# Patient Record
Sex: FEMALE | Race: ASIAN | NOT HISPANIC OR LATINO | Employment: UNEMPLOYED | URBAN - METROPOLITAN AREA
[De-identification: names, ages, dates, MRNs, and addresses within clinical notes are randomized per-mention and may not be internally consistent; named-entity substitution may affect disease eponyms.]

---

## 2017-01-09 ENCOUNTER — HISTORICAL (OUTPATIENT)
Dept: CARDIOLOGY | Facility: HOSPITAL | Age: 52
End: 2017-01-09

## 2017-03-14 ENCOUNTER — HISTORICAL (OUTPATIENT)
Dept: RADIOLOGY | Facility: HOSPITAL | Age: 52
End: 2017-03-14

## 2017-03-15 ENCOUNTER — HISTORICAL (OUTPATIENT)
Dept: CARDIOLOGY | Facility: HOSPITAL | Age: 52
End: 2017-03-15

## 2017-03-16 ENCOUNTER — HISTORICAL (OUTPATIENT)
Dept: CARDIOLOGY | Facility: HOSPITAL | Age: 52
End: 2017-03-16

## 2017-03-21 ENCOUNTER — HISTORICAL (OUTPATIENT)
Dept: INTERNAL MEDICINE | Facility: CLINIC | Age: 52
End: 2017-03-21

## 2017-10-27 ENCOUNTER — HISTORICAL (OUTPATIENT)
Dept: RADIOLOGY | Facility: HOSPITAL | Age: 52
End: 2017-10-27

## 2018-10-10 ENCOUNTER — HISTORICAL (OUTPATIENT)
Dept: ADMINISTRATIVE | Facility: HOSPITAL | Age: 53
End: 2018-10-10

## 2018-10-10 LAB
ABS NEUT (OLG): 3.8 X10(3)/MCL (ref 2.1–9.2)
ALBUMIN SERPL-MCNC: 3.9 GM/DL (ref 3.4–5)
ALBUMIN/GLOB SERPL: 1 RATIO (ref 1–2)
ALP SERPL-CCNC: 80 UNIT/L (ref 45–117)
ALT SERPL-CCNC: 22 UNIT/L (ref 12–78)
APPEARANCE, UA: ABNORMAL
AST SERPL-CCNC: 19 UNIT/L (ref 15–37)
BACTERIA #/AREA URNS AUTO: ABNORMAL /[HPF]
BASOPHILS # BLD AUTO: 0.02 X10(3)/MCL
BASOPHILS NFR BLD AUTO: 0 %
BILIRUB SERPL-MCNC: 0.2 MG/DL (ref 0.2–1)
BILIRUB UR QL STRIP: NEGATIVE
BILIRUBIN DIRECT+TOT PNL SERPL-MCNC: <0.1 MG/DL
BILIRUBIN DIRECT+TOT PNL SERPL-MCNC: ABNORMAL MG/DL
BUN SERPL-MCNC: 14 MG/DL (ref 7–18)
CALCIUM SERPL-MCNC: 8.8 MG/DL (ref 8.5–10.1)
CHLORIDE SERPL-SCNC: 109 MMOL/L (ref 98–107)
CO2 SERPL-SCNC: 31 MMOL/L (ref 21–32)
COLOR UR: YELLOW
CREAT SERPL-MCNC: 0.6 MG/DL (ref 0.6–1.3)
EOSINOPHIL # BLD AUTO: 0.22 X10(3)/MCL
EOSINOPHIL NFR BLD AUTO: 3 %
ERYTHROCYTE [DISTWIDTH] IN BLOOD BY AUTOMATED COUNT: 12.7 % (ref 11.5–14.5)
EST. AVERAGE GLUCOSE BLD GHB EST-MCNC: 117 MG/DL
GLOBULIN SER-MCNC: 3.8 GM/ML (ref 2.3–3.5)
GLUCOSE (UA): NORMAL
GLUCOSE SERPL-MCNC: 73 MG/DL (ref 74–106)
HBA1C MFR BLD: 5.7 % (ref 4.2–6.3)
HCT VFR BLD AUTO: 39.3 % (ref 35–46)
HGB BLD-MCNC: 12.5 GM/DL (ref 12–16)
HGB UR QL STRIP: NEGATIVE
HYALINE CASTS #/AREA URNS LPF: ABNORMAL /[LPF]
IMM GRANULOCYTES # BLD AUTO: 0.02 10*3/UL
IMM GRANULOCYTES NFR BLD AUTO: 0 %
KETONES UR QL STRIP: NEGATIVE
LEUKOCYTE ESTERASE UR QL STRIP: 75 LEU/UL
LYMPHOCYTES # BLD AUTO: 2.71 X10(3)/MCL
LYMPHOCYTES NFR BLD AUTO: 37 % (ref 13–40)
MCH RBC QN AUTO: 28.3 PG (ref 26–34)
MCHC RBC AUTO-ENTMCNC: 31.8 GM/DL (ref 31–37)
MCV RBC AUTO: 89.1 FL (ref 80–100)
MONOCYTES # BLD AUTO: 0.53 X10(3)/MCL
MONOCYTES NFR BLD AUTO: 7 % (ref 4–12)
NEUTROPHILS # BLD AUTO: 3.8 X10(3)/MCL
NEUTROPHILS NFR BLD AUTO: 52 X10(3)/MCL
NITRITE UR QL STRIP: NEGATIVE
PH UR STRIP: 7 [PH] (ref 4.5–8)
PLATELET # BLD AUTO: 320 X10(3)/MCL (ref 130–400)
PMV BLD AUTO: 8.5 FL (ref 7.4–10.4)
POTASSIUM SERPL-SCNC: 3.5 MMOL/L (ref 3.5–5.1)
PROT SERPL-MCNC: 7.7 GM/DL (ref 6.4–8.2)
PROT UR QL STRIP: NEGATIVE
RBC # BLD AUTO: 4.41 X10(6)/MCL (ref 4–5.2)
RBC #/AREA URNS AUTO: ABNORMAL /[HPF]
SODIUM SERPL-SCNC: 144 MMOL/L (ref 136–145)
SP GR UR STRIP: 1.02 (ref 1–1.03)
SQUAMOUS #/AREA URNS LPF: ABNORMAL /[LPF]
UROBILINOGEN UR STRIP-ACNC: NORMAL
WBC # SPEC AUTO: 7.3 X10(3)/MCL (ref 4.5–11)
WBC #/AREA URNS AUTO: ABNORMAL /HPF

## 2018-10-12 LAB — FINAL CULTURE: NO GROWTH

## 2018-10-16 ENCOUNTER — HISTORICAL (OUTPATIENT)
Dept: INTERNAL MEDICINE | Facility: CLINIC | Age: 53
End: 2018-10-16

## 2018-11-01 ENCOUNTER — HISTORICAL (OUTPATIENT)
Dept: ADMINISTRATIVE | Facility: HOSPITAL | Age: 53
End: 2018-11-01

## 2018-11-01 LAB
ALBUMIN SERPL-MCNC: 4 GM/DL (ref 3.4–5)
ALBUMIN/GLOB SERPL: 1 RATIO (ref 1–2)
ALP SERPL-CCNC: 81 UNIT/L (ref 45–117)
ALT SERPL-CCNC: 22 UNIT/L (ref 12–78)
APPEARANCE, UA: CLEAR
AST SERPL-CCNC: 21 UNIT/L (ref 15–37)
BACTERIA #/AREA URNS AUTO: ABNORMAL /[HPF]
BILIRUB SERPL-MCNC: 0.4 MG/DL (ref 0.2–1)
BILIRUB UR QL STRIP: NEGATIVE
BILIRUBIN DIRECT+TOT PNL SERPL-MCNC: 0.1 MG/DL
BILIRUBIN DIRECT+TOT PNL SERPL-MCNC: 0.3 MG/DL
BUN SERPL-MCNC: 14 MG/DL (ref 7–18)
CALCIUM SERPL-MCNC: 8.8 MG/DL (ref 8.5–10.1)
CHLORIDE SERPL-SCNC: 105 MMOL/L (ref 98–107)
CHOLEST SERPL-MCNC: 222 MG/DL
CHOLEST/HDLC SERPL: 3 {RATIO} (ref 0–4.4)
CO2 SERPL-SCNC: 29 MMOL/L (ref 21–32)
COLOR UR: NORMAL
CREAT SERPL-MCNC: 0.5 MG/DL (ref 0.6–1.3)
DEPRECATED CALCIDIOL+CALCIFEROL SERPL-MC: 26.48 NG/ML (ref 30–80)
GLOBULIN SER-MCNC: 3.8 GM/ML (ref 2.3–3.5)
GLUCOSE (UA): NORMAL
GLUCOSE SERPL-MCNC: 80 MG/DL (ref 74–106)
HDLC SERPL-MCNC: 75 MG/DL
HGB UR QL STRIP: NEGATIVE
HYALINE CASTS #/AREA URNS LPF: ABNORMAL /[LPF]
KETONES UR QL STRIP: NEGATIVE
LDLC SERPL CALC-MCNC: 117 MG/DL (ref 0–130)
LEUKOCYTE ESTERASE UR QL STRIP: NEGATIVE
NITRITE UR QL STRIP: NEGATIVE
PH UR STRIP: 6.5 [PH] (ref 4.5–8)
POTASSIUM SERPL-SCNC: 4.1 MMOL/L (ref 3.5–5.1)
PROT SERPL-MCNC: 7.8 GM/DL (ref 6.4–8.2)
PROT UR QL STRIP: NEGATIVE
RBC #/AREA URNS AUTO: ABNORMAL /[HPF]
SODIUM SERPL-SCNC: 141 MMOL/L (ref 136–145)
SP GR UR STRIP: 1.02 (ref 1–1.03)
SQUAMOUS #/AREA URNS LPF: ABNORMAL /[LPF]
TRIGL SERPL-MCNC: 152 MG/DL
TSH SERPL-ACNC: 0.94 MIU/L (ref 0.36–3.74)
UROBILINOGEN UR STRIP-ACNC: NORMAL
VLDLC SERPL CALC-MCNC: 30 MG/DL
WBC #/AREA URNS AUTO: ABNORMAL /HPF

## 2018-12-20 ENCOUNTER — HISTORICAL (OUTPATIENT)
Dept: ADMINISTRATIVE | Facility: HOSPITAL | Age: 53
End: 2018-12-20

## 2019-01-31 ENCOUNTER — HISTORICAL (OUTPATIENT)
Dept: INTERNAL MEDICINE | Facility: CLINIC | Age: 54
End: 2019-01-31

## 2019-01-31 LAB
CHOLEST SERPL-MCNC: 278 MG/DL
CHOLEST/HDLC SERPL: 2.7 {RATIO} (ref 0–4.4)
HDLC SERPL-MCNC: 102 MG/DL
LDLC SERPL CALC-MCNC: 156 MG/DL (ref 0–130)
TRIGL SERPL-MCNC: 98 MG/DL
VLDLC SERPL CALC-MCNC: 20 MG/DL

## 2019-03-28 ENCOUNTER — HISTORICAL (OUTPATIENT)
Dept: RADIOLOGY | Facility: HOSPITAL | Age: 54
End: 2019-03-28

## 2019-06-26 ENCOUNTER — HISTORICAL (OUTPATIENT)
Dept: INTERNAL MEDICINE | Facility: CLINIC | Age: 54
End: 2019-06-26

## 2019-06-26 LAB
CHOLEST SERPL-MCNC: 228 MG/DL
CHOLEST/HDLC SERPL: 2.7 {RATIO} (ref 0–4.4)
HDLC SERPL-MCNC: 86 MG/DL
LDLC SERPL CALC-MCNC: 124 MG/DL (ref 0–130)
TRIGL SERPL-MCNC: 88 MG/DL
VLDLC SERPL CALC-MCNC: 18 MG/DL

## 2019-06-27 ENCOUNTER — HISTORICAL (OUTPATIENT)
Dept: RADIOLOGY | Facility: HOSPITAL | Age: 54
End: 2019-06-27

## 2020-01-08 ENCOUNTER — HISTORICAL (OUTPATIENT)
Dept: ADMINISTRATIVE | Facility: HOSPITAL | Age: 55
End: 2020-01-08

## 2020-01-08 LAB
ABS NEUT (OLG): 3.04 X10(3)/MCL (ref 2.1–9.2)
ALBUMIN SERPL-MCNC: 3.8 GM/DL (ref 3.4–5)
ALBUMIN/GLOB SERPL: 1.1 RATIO (ref 1.1–2)
ALP SERPL-CCNC: 62 UNIT/L (ref 45–117)
ALT SERPL-CCNC: 26 UNIT/L (ref 12–78)
APPEARANCE, UA: NORMAL
AST SERPL-CCNC: 23 UNIT/L (ref 15–37)
BACTERIA #/AREA URNS AUTO: ABNORMAL /HPF
BASOPHILS # BLD AUTO: 0 X10(3)/MCL (ref 0–0.2)
BASOPHILS NFR BLD AUTO: 0 %
BILIRUB SERPL-MCNC: 0.5 MG/DL (ref 0.2–1)
BILIRUB UR QL STRIP: NEGATIVE
BILIRUBIN DIRECT+TOT PNL SERPL-MCNC: 0.1 MG/DL (ref 0–0.2)
BILIRUBIN DIRECT+TOT PNL SERPL-MCNC: 0.4 MG/DL
BUN SERPL-MCNC: 10 MG/DL (ref 7–18)
CALCIUM SERPL-MCNC: 8.8 MG/DL (ref 8.5–10.1)
CHLORIDE SERPL-SCNC: 108 MMOL/L (ref 98–107)
CHOLEST SERPL-MCNC: 230 MG/DL
CHOLEST/HDLC SERPL: 2.6 {RATIO} (ref 0–4.4)
CO2 SERPL-SCNC: 29 MMOL/L (ref 21–32)
COLOR UR: YELLOW
CREAT SERPL-MCNC: 0.6 MG/DL (ref 0.6–1.3)
EOSINOPHIL # BLD AUTO: 0.3 X10(3)/MCL (ref 0–0.9)
EOSINOPHIL NFR BLD AUTO: 5 %
ERYTHROCYTE [DISTWIDTH] IN BLOOD BY AUTOMATED COUNT: 12.7 % (ref 11.5–14.5)
EST. AVERAGE GLUCOSE BLD GHB EST-MCNC: 117 MG/DL
GLOBULIN SER-MCNC: 3.4 GM/ML (ref 2.3–3.5)
GLUCOSE (UA): NEGATIVE
GLUCOSE SERPL-MCNC: 85 MG/DL (ref 74–106)
HBA1C MFR BLD: 5.7 % (ref 4.2–6.3)
HCT VFR BLD AUTO: 42.7 % (ref 35–46)
HDLC SERPL-MCNC: 87 MG/DL (ref 40–59)
HGB BLD-MCNC: 13.3 GM/DL (ref 12–16)
HGB UR QL STRIP: NEGATIVE
HYALINE CASTS #/AREA URNS LPF: ABNORMAL /LPF
IMM GRANULOCYTES # BLD AUTO: 0.02 10*3/UL
IMM GRANULOCYTES NFR BLD AUTO: 0 %
KETONES UR QL STRIP: NEGATIVE
LDLC SERPL CALC-MCNC: 111 MG/DL
LEUKOCYTE ESTERASE UR QL STRIP: NEGATIVE
LYMPHOCYTES # BLD AUTO: 2 X10(3)/MCL (ref 0.6–4.6)
LYMPHOCYTES NFR BLD AUTO: 35 %
MCH RBC QN AUTO: 28.3 PG (ref 26–34)
MCHC RBC AUTO-ENTMCNC: 31.1 GM/DL (ref 31–37)
MCV RBC AUTO: 90.9 FL (ref 80–100)
MONOCYTES # BLD AUTO: 0.4 X10(3)/MCL (ref 0.1–1.3)
MONOCYTES NFR BLD AUTO: 6 %
NEUTROPHILS # BLD AUTO: 3.04 X10(3)/MCL (ref 2.1–9.2)
NEUTROPHILS NFR BLD AUTO: 53 %
NITRITE UR QL STRIP: NEGATIVE
PH UR STRIP: 7.5 [PH] (ref 4.5–8)
PLATELET # BLD AUTO: 278 X10(3)/MCL (ref 130–400)
PMV BLD AUTO: 8.3 FL (ref 7.4–10.4)
POTASSIUM SERPL-SCNC: 4.2 MMOL/L (ref 3.5–5.1)
PROT SERPL-MCNC: 7.2 GM/DL (ref 6.4–8.2)
PROT UR QL STRIP: NEGATIVE
RBC # BLD AUTO: 4.7 X10(6)/MCL (ref 4–5.2)
RBC #/AREA URNS AUTO: ABNORMAL /HPF
SODIUM SERPL-SCNC: 142 MMOL/L (ref 136–145)
SP GR UR STRIP: 1.01 (ref 1–1.03)
SQUAMOUS #/AREA URNS LPF: ABNORMAL /LPF
TRIGL SERPL-MCNC: 158 MG/DL
TSH SERPL-ACNC: 0.65 MIU/L (ref 0.36–3.74)
UROBILINOGEN UR STRIP-ACNC: NORMAL
VLDLC SERPL CALC-MCNC: 32 MG/DL
WBC # SPEC AUTO: 5.8 X10(3)/MCL (ref 4.5–11)
WBC #/AREA URNS AUTO: ABNORMAL /HPF

## 2020-01-13 ENCOUNTER — HISTORICAL (OUTPATIENT)
Dept: INTERNAL MEDICINE | Facility: CLINIC | Age: 55
End: 2020-01-13

## 2021-06-16 ENCOUNTER — HISTORICAL (OUTPATIENT)
Dept: RADIOLOGY | Facility: HOSPITAL | Age: 56
End: 2021-06-16

## 2021-10-13 ENCOUNTER — HISTORICAL (OUTPATIENT)
Dept: INTERNAL MEDICINE | Facility: CLINIC | Age: 56
End: 2021-10-13

## 2021-10-13 LAB
ABS NEUT (OLG): 2.68 X10(3)/MCL (ref 2.1–9.2)
ALBUMIN SERPL-MCNC: 3.9 GM/DL (ref 3.5–5)
ALBUMIN/GLOB SERPL: 1.1 RATIO (ref 1.1–2)
ALP SERPL-CCNC: 59 UNIT/L (ref 40–150)
ALT SERPL-CCNC: 13 UNIT/L (ref 0–55)
APPEARANCE, UA: NORMAL
AST SERPL-CCNC: 22 UNIT/L (ref 5–34)
BACTERIA #/AREA URNS AUTO: ABNORMAL /HPF
BASOPHILS # BLD AUTO: 0 X10(3)/MCL (ref 0–0.2)
BASOPHILS NFR BLD AUTO: 0 %
BILIRUB SERPL-MCNC: 0.7 MG/DL
BILIRUB UR QL STRIP: NEGATIVE
BILIRUBIN DIRECT+TOT PNL SERPL-MCNC: 0.2 MG/DL (ref 0–0.5)
BILIRUBIN DIRECT+TOT PNL SERPL-MCNC: 0.5 MG/DL (ref 0–0.8)
BUN SERPL-MCNC: 10.6 MG/DL (ref 9.8–20.1)
CALCIUM SERPL-MCNC: 9.4 MG/DL (ref 8.4–10.2)
CHLORIDE SERPL-SCNC: 108 MMOL/L (ref 98–107)
CHOLEST SERPL-MCNC: 226 MG/DL
CHOLEST/HDLC SERPL: 3 {RATIO} (ref 0–5)
CO2 SERPL-SCNC: 25 MMOL/L (ref 22–29)
COLOR UR: NORMAL
CREAT SERPL-MCNC: 0.67 MG/DL (ref 0.55–1.02)
DEPRECATED CALCIDIOL+CALCIFEROL SERPL-MC: 56 NG/ML (ref 30–80)
EOSINOPHIL # BLD AUTO: 0.3 X10(3)/MCL (ref 0–0.9)
EOSINOPHIL NFR BLD AUTO: 5 %
ERYTHROCYTE [DISTWIDTH] IN BLOOD BY AUTOMATED COUNT: 12.7 % (ref 11.5–14.5)
EST. AVERAGE GLUCOSE BLD GHB EST-MCNC: 111.2 MG/DL
GLOBULIN SER-MCNC: 3.4 GM/DL (ref 2.4–3.5)
GLUCOSE (UA): NEGATIVE
GLUCOSE SERPL-MCNC: 87 MG/DL (ref 74–100)
HBA1C MFR BLD: 5.5 %
HCT VFR BLD AUTO: 41.9 % (ref 35–46)
HDLC SERPL-MCNC: 70 MG/DL (ref 35–60)
HGB BLD-MCNC: 13.4 GM/DL (ref 12–16)
HGB UR QL STRIP: NEGATIVE
HYALINE CASTS #/AREA URNS LPF: ABNORMAL /LPF
IMM GRANULOCYTES # BLD AUTO: 0.01 10*3/UL
IMM GRANULOCYTES NFR BLD AUTO: 0 %
KETONES UR QL STRIP: NEGATIVE
LDLC SERPL CALC-MCNC: 124 MG/DL (ref 50–140)
LEUKOCYTE ESTERASE UR QL STRIP: NEGATIVE
LYMPHOCYTES # BLD AUTO: 2.2 X10(3)/MCL (ref 0.6–4.6)
LYMPHOCYTES NFR BLD AUTO: 39 %
MCH RBC QN AUTO: 28.5 PG (ref 26–34)
MCHC RBC AUTO-ENTMCNC: 32 GM/DL (ref 31–37)
MCV RBC AUTO: 89 FL (ref 80–100)
MONOCYTES # BLD AUTO: 0.4 X10(3)/MCL (ref 0.1–1.3)
MONOCYTES NFR BLD AUTO: 6 %
NEUTROPHILS # BLD AUTO: 2.68 X10(3)/MCL (ref 2.1–9.2)
NEUTROPHILS NFR BLD AUTO: 48 %
NITRITE UR QL STRIP: NEGATIVE
NRBC BLD AUTO-RTO: 0 % (ref 0–0.2)
PH UR STRIP: 7.5 [PH] (ref 4.5–8)
PLATELET # BLD AUTO: 264 X10(3)/MCL (ref 130–400)
PMV BLD AUTO: 8.4 FL (ref 7.4–10.4)
POTASSIUM SERPL-SCNC: 4.3 MMOL/L (ref 3.5–5.1)
PROT SERPL-MCNC: 7.3 GM/DL (ref 6.4–8.3)
PROT UR QL STRIP: NEGATIVE
RBC # BLD AUTO: 4.71 X10(6)/MCL (ref 4–5.2)
RBC #/AREA URNS AUTO: ABNORMAL /HPF
SODIUM SERPL-SCNC: 141 MMOL/L (ref 136–145)
SP GR UR STRIP: 1.01 (ref 1–1.03)
SQUAMOUS #/AREA URNS LPF: ABNORMAL /LPF
TRIGL SERPL-MCNC: 160 MG/DL (ref 37–140)
TSH SERPL-ACNC: 0.96 UIU/ML (ref 0.35–4.94)
UROBILINOGEN UR STRIP-ACNC: NORMAL
VLDLC SERPL CALC-MCNC: 32 MG/DL
WBC # SPEC AUTO: 5.6 X10(3)/MCL (ref 4.5–11)
WBC #/AREA URNS AUTO: ABNORMAL /HPF

## 2022-04-10 ENCOUNTER — HISTORICAL (OUTPATIENT)
Dept: ADMINISTRATIVE | Facility: HOSPITAL | Age: 57
End: 2022-04-10
Payer: MEDICAID

## 2022-04-28 VITALS
BODY MASS INDEX: 19.1 KG/M2 | DIASTOLIC BLOOD PRESSURE: 70 MMHG | BODY MASS INDEX: 18.38 KG/M2 | WEIGHT: 99.88 LBS | HEIGHT: 62 IN | OXYGEN SATURATION: 99 % | WEIGHT: 103.81 LBS | SYSTOLIC BLOOD PRESSURE: 123 MMHG | SYSTOLIC BLOOD PRESSURE: 106 MMHG | DIASTOLIC BLOOD PRESSURE: 80 MMHG | HEIGHT: 62 IN

## 2022-05-03 NOTE — HISTORICAL [facility] CERNER
This is a historical note converted from [employer]. Formatting and pictures may have been removed.  Please reference Jaylene for original formatting and attached multimedia. Chief Complaint  Patient c/o dizziness and weekness x5 months  History of Present Illness  52 yo female with complaints of dizziness and weakness x 5 months. Dizziness with movement of head back and forth while in bed. Denies sinus congestion or pressure; no ear pain. ?No numbness, tingling, or weakness of extremities. ?No facial weakness?or difficulty speaking. ?Reports recent polydipsia recently when she feels week; no polyuria. No recent weight changes. ?Denies nausea or vomiting; no urinary complaints. No chest pain. She missed last appointment with [facility] in May for routine followup. ?Patient had a cardiology?workup last year for palpitations which was normal.  Review of Systems  GENERAL: Negative except as stated?in HPI  CV: Negative except as stated in HPI  RESP: Negative except as stated?in HPI  GI: Negative?except as stated in?HPI  : Negative?except as stated in?HPI  SKIN: Negative?except as stated in?HPI  Neuro: Negative?except as stated in?HPI  MS: Negative?except as stated in?HPI  Psych: Negative?except as stated in?HPI  Physical Exam  Vitals & Measurements  T:?36.6? ?C (Oral)? HR:?80(Peripheral)? BP:?123/80? BP:?112/71(Sitting)? BP:?120/82(Standing)? BP:?105/67(Supine)?  HT:?157?cm? HT:?157?cm? WT:?47.1?kg? WT:?47.1?kg? BMI:?19.11?  Vital Signs reviewed  GENERAL: Awake and alert; no acute distress.  EYES: Pupils round,?equal and?reactive to light. Extraocular movements intact. No exophthalmos.  HENT: Normocephalic. Normal appearing oral cavity and posterior pharynx. Tympanic membranes normal; no erythema, effusion or perforation.  CV: Normal rate and rhythm. No murmur or JVD. No edema. Normal peripheral pulses and perfusion.  RESP: Respirations even and nonlabored. BBS clear to auscultation.  MUSCULOSKELETAL: Full passive and active ROM  of all joints. No bony deformities,?joint tenderness or swelling.?Normal gait.??Symmetrical?+2 strength to all extremities.  NEURO: Awake, alert and oriented to person, place, time and situation. Normal speech pattern. ?No focal or sensory deficits noted.  INTEGUMENTARY: Skin warm, dry, and intact. No rashes or?unusual bruising.  PSYCH: Appropriate mood and affect; cooperative.  Assessment/Plan  1.?Vertigo,?Dizziness  Neuro exam normal. CBC unremarkable, urine with + leuk estrace; urine culture and sensitivity pending. Orthostatic vital signs normal. Rx for meclizine. TO ER for new developing neurological symptoms or worsening in condition. PCP appointment with C at discharge.  Ordered:  After Hrs Visit [id number] PC, Dizziness  Vertigo, 10/10/18 18:58:00 CDT  Comprehensive Metabolic Panel, Stat collect, 10/10/18 18:15:00 CDT, Blood, Collected, Stop date 10/10/18 18:15:00 CDT, Nurse collect, Dizziness, 10/10/18 18:15:00 CDT  Office/Outpatient Visit Level 3 Established [id number] PC, Dizziness  Vertigo, 10/10/18 18:58:00 CDT  Orthostatic Vital Signs, 10/10/18 18:14:00 CDT, Stop date 10/10/18 18:14:00 CDT  ?  Polydipsia  CMP with glucose of 73 mg/dl. A1c pending. Small frequent meals. Follow up with PCP.  Ordered:  Hemoglobin A1C [facility], Stat collect, 10/10/18 18:15:00 CDT, Blood, Stop date 10/10/18 18:16:00 CDT, Nurse collect, Polydipsia, 10/10/18 18:15:00 CDT  ?  Orders:  meclizine, 12.5 mg = 1 tab(s), Oral, TID, PRN PRN as needed for dizziness, # 15 tab(s), 0 Refill(s), Pharmacy: [facility] [id number]   Problem List/Past Medical History  Ongoing  Elevated lipids  Visit for screening mammogram  Well adult exam  Historical  No qualifying data  Procedure/Surgical History  Abdominal hysterectomy   Medications  ACETAMINOPHEN/COD #3 (300/30MG) TAB, 1 tab(s), Oral, q4-6hr  AMOXICILLIN 500MG CAPSULES  aspirin 81 mg oral tablet, 81 mg= 1 tab(s), Oral, Daily,? ?Not taking  calcium (as carbonate)-vitamin D 600 mg-400 intl units  oral tablet, 1 tab(s), Oral, BID,? ?Not taking  CHLORHEXIDINE 0.12% ORAL RINSE, Oral,? ?Not taking  meclizine 12.5 mg oral tablet, 12.5 mg= 1 tab(s), Oral, TID, PRN  One-A-Day 50+, 1 tab(s), Oral, Daily  Allergies  No Known Allergies  Social History  Alcohol  Never, 10/08/2015  Employment/School  Unemployed, 01/25/2017  Unemployed, Work/School description: for about 5 months., 12/21/2016  Employed, 10/08/2015  Home/Environment  Lives with Spouse. Living situation: Home/Independent. Alcohol abuse in household: No. Substance abuse in household: No. Smoker in household: No. Feels unsafe at home: No. Safe place to go: Yes. Family/Friends available for support: Yes., 01/25/2017  Lives with Alone. Living situation: Home/Independent. Alcohol abuse in household: No. Substance abuse in household: No. Smoker in household: No. Injuries/Abuse/Neglect in household: No. Feels unsafe at home: Yes. Safe place to go: Yes. Agency(s)/Others notified: No. Family/Friends available for support: Yes. Concern for family members at home: No. Major illness in household: No. Financial concerns: No. TV/Computer concerns: No., 11/21/2016  Lives with Alone., 10/08/2015  Nutrition/Health  Vegetarian, 01/25/2017  Other  Sexual  Substance Abuse  Never, 10/08/2015  Tobacco  Never smoker, 10/08/2015  Family History  Hypertension.: Mother.  Immunizations  Vaccine Date Status   tetanus/diphtheria/pertussis, acel(Tdap) 11/21/2016 Given   influenza virus vaccine, inactivated 11/21/2016 Given   Health Maintenance  Health Maintenance  ???Pending?(in the next year)  ??? ??OverDue  ??? ? ? ?Diabetes Screening due??and every?  ??? ? ? ?Colorectal Screening due??12/14/17??and every 1??year(s)  ??? ? ? ?Alcohol Misuse Screening due??07/25/18??and every 1??year(s)  ??? ??Due?  ??? ? ? ?ADL Screening due??10/10/18??and every 1??year(s)  ??? ? ? ?Influenza Vaccine due??10/10/18??and every?  ???Satisfied?(in the past 1 year)  ??? ??Satisfied?  ??? ? ? ?Blood  Pressure Screening on??10/10/18.??Satisfied by Lisa Conway LPN  ??? ? ? ?Body Mass Index Check on??10/10/18.??Satisfied by Becky Moody MA  ??? ? ? ?Breast Cancer Screening on??10/27/17.??Satisfied by Odette Lund  ??? ? ? ?Depression Screening on??10/10/18.??Satisfied by Becky Moody MA  ??? ? ? ?Diabetes Screening on??10/10/18.??Satisfied by Kelley Chang  ??? ? ? ?Influenza Vaccine on??10/10/18.??Satisfied by Becky Moody MA  ??? ? ? ?Lipid Screening on??04/25/18.??Satisfied by Madalyn Acevedo NP  ??? ? ? ?Obesity Screening on??10/10/18.??Satisfied by Becky Moody MA  ?  ?  Lab Results  Test Name Test Result Date/Time   Sodium Lvl 144 mmol/L 10/10/2018 18:15 CDT   Potassium Lvl 3.5 mmol/L 10/10/2018 18:15 CDT   Glucose Lvl 73 mg/dL (Low) 10/10/2018 18:15 CDT    mg/dL (High) 10/10/2018 18:15 CDT   BUN 14 mg/dL 10/10/2018 18:15 CDT   Creatinine 0.60 mg/dL 10/10/2018 18:15 CDT   Hgb A1c 5.7 % 10/10/2018 18:15 CDT   WBC 7.3 x10(3)/mcL 10/10/2018 18:15 CDT   Hgb 12.5 gm/dL 10/10/2018 18:15 CDT   Hct 39.3 % 10/10/2018 18:15 CDT   Platelet 320 x10(3)/mcL 10/10/2018 18:15 CDT   UA Appear Cloudy 10/10/2018 18:15 CDT   UA Urobilinogen Normal 10/10/2018 18:15 CDT   UA Blood Negative 10/10/2018 18:15 CDT   UA Glucose Normal 10/10/2018 18:15 CDT   UA Ketones Negative 10/10/2018 18:15 CDT   UA Protein Negative 10/10/2018 18:15 CDT   UA Nitrite Negative 10/10/2018 18:15 CDT   UA Leuk Est 75 (Abnormal) 10/10/2018 18:15 CDT   UA WBC Interp 3-5 (Abnormal) 10/10/2018 18:15 CDT   UA RBC Interp 3-5 (Abnormal) 10/10/2018 18:15 CDT

## 2022-05-03 NOTE — HISTORICAL [facility] CERNER
This is a historical note converted from [employer]. Formatting and pictures may have been removed.  Please reference Cerradha for original formatting and attached multimedia. Chief Complaint  follow up  History of Present Illness  Pt is a?54 Years?old? Female here for f/u visit. ?PMH palpitations, dizziness, osteopenia and HLD. Followed by GYN clinic here at [facility]. Pt had not stayed on 6/27/19 for MMG as scheduled; has been re-ordered per GYN. Was seen in [facility] on 9/18/19 for musculoskeletal neck pain that has resolved with conservative measures, NSAIDS and conservative measures.?Pt mostly vegetarian, eats meat every once in?a while?and does not eat any beans, oatmeal, bread, yogurt or nuts. Has?continued eating snacks q2-3 hours in between 3 regular meals. Follows low cholesterol diet and exercises daily. Continues to decline statin. States stopped taking omega 3 fish oil ~ 3 weeks ago. Denies chest pain, shortness of breath,?cough, fever, headache, abdominal pain, nausea,?vomiting, diarrhea, constipation, dysuria, depression, anxiety, SI/HI.  Review of Systems  Constitutional: negative except as stated in HPI  Eye: negative except as stated in HPI  ENT: negative except as stated in HPI  Respiratory: negative except as stated in HPI  Cardiovascular: negative except as stated in HPI  Gastrointestinal: negative except as stated in HPI  Genitourinary: negative except as stated in HPI  Heme/Lymph: negative except as stated in HPI  Endocrine: negative except as stated in HPI  Immunologic: negative except as stated in HPI  Musculoskeletal: negative except as stated in HPI  Integumentary: negative except as stated in HPI  Neurologic: negative except as stated in HPI  ?   All Other ROS_ negative except as stated in HPI  Physical Exam  Vitals & Measurements  T:?36.3? ?C (Oral)? HR:?62(Peripheral)? RR:?18? BP:?108/73?  HT:?157?cm? WT:?45.2?kg? BMI:?18.34?  General: Alert and oriented, No acute distress.  Head: Normocephalic,  Atraumatic.  Eye: Pupils are equal, round and reactive to light, Extraocular movements are intact, Normal conjunctiva, Sclera non-icteric.  Ears/Nose/Mouth/Throat: Normal hearing, Oral mucosa is moist, No pharyngeal erythema.  Neck/Thyroid: Supple, Non-tender, No lymphadenopathy, No thyromegaly, No carotid bruit, No JVD, Full range of motion.  Respiratory: Clear to auscultation bilaterally, Non-labored Respirations, Breath sounds are equal, Symmetrical chest wall expansion, No wheezes, rales or rhonchi.  Cardiovascular: Regular rate and rhythm, Normal S1/S2, No murmurs, rubs or gallops.?Normal peripheral perfusion, No edema.  Gastrointestinal: Soft, Non-tender, Non-distended, Normal bowel sounds, No palpable organomegaly.  Genitourinary: No costovertebral angle tenderness, No inguinal tenderness.  Musculoskeletal: Normal range of motion, Normal strength, No tenderness, Normal gait.  Integumentary: Warm, Dry, Intact, No suspicious lesions or rashes.  Neurologic: No focal deficits, Cranial Nerves II-XII are grossly intact, Motor strength normal upper and lower extremities, Sensory exam intact.  Psychiatric: Normal interaction, Coherent speech, Euthymic mood, Appropriate affect.  Feet:? 2+ pedal pulses bilaterally.  Assessment/Plan  1.?HLD (hyperlipidemia)?E78.5  ?LDL?111?/ Trig?158 / HDL - 87 / Total chol - 230.  Continues to decline statin.  Follow a low cholesterol, low saturated fat diet with less than 200 mg of cholesterol a day.?  Avoid fried foods and high saturated fats (zuniga, sausage, cookies, cakes, chips, cheese, whole milk, butter, mayonnaise, creamy dressings, gravy and cream sauces).  Add flax seed or fish oil supplements to diet.?  Increase dietary fiber.?  Regular exercise improves cholesterol levels.  Physical activity 5 times a week for 30 minutes per day (or 150 minutes per week).?  Stressed importance of dietary modifications.  Ordered:  1160F- Medication reconciliation completed during visit, D  (hyperlipidemia)  Osteopenia  Vitamin D deficiency, [facility], 01/08/20 9:14:00 CST  Clinic Follow up, *Est. 07/08/20 3:00:00 CDT, Order for future visit, HLD (hyperlipidemia)  Osteopenia, [facility]  Lipid Panel, Routine collect, *Est. 07/08/20 3:00:00 CDT, Blood, Order for future visit, *Est. Stop date 07/08/20 3:00:00 CDT, Lab Collect, HLD (hyperlipidemia), 01/08/20 9:14:00 CST  Office/Outpatient Visit Level 3 Established [id number] PC, HLD (hyperlipidemia)  Osteopenia  Vitamin D deficiency, [facility], 01/08/20 9:14:00 CST  ?  2.?Osteopenia?M85.80  ?Continue MVI and vitamin D.  Declines additional calcium supplement as recommended.  Take Calcium 600 mg twice a day?and Vitamin D 2000 IU daily.  Weight bearing exercises 4-5 times per week to build bones.  Avoid soda and excessive alcohol.  Avoid falls, wear proper footwear, and use proper lighting when ambulating.  Repeat Dexascan in?6/2021.  Ordered:  1160F- Medication reconciliation completed during visit, HLD (hyperlipidemia)  Osteopenia  Vitamin D deficiency, [facility], 01/08/20 9:14:00 CST  Clinic Follow up, *Est. 07/08/20 3:00:00 CDT, Order for future visit, HLD (hyperlipidemia)  Osteopenia, [facility]  Office/Outpatient Visit Level 3 Established [id number] PC, HLD (hyperlipidemia)  Osteopenia  Vitamin D deficiency, [facility], 01/08/20 9:14:00 CST  ?  3.?Well adult exam?Z00.00  ?Cervical Cancer Screening- Last Pap/Pelvic?unsure. Previously referred to GYN; keep apt when scheduled.  Breast Cancer Screening- Last Mammogram 10/27/17, birads 1. MMG scheduled for 6/29/2020; keep apt.  Colon Cancer Screening- FIT negative on?10/16/18. FIT ordered.  Osteoporosis Screening- Last DEXA scan in 6/2019. Results show osteopenia. Repeat dexa in 6/2021.  ?  4.?Vitamin D deficiency?E55.9  ?Educated on increasing foods high in Vitamin D such as fish oil, cod liver oil, salmon, milk fortified with vitamin D.  Continue?taking Vitamin D 2000 I.U. tablets  daily (purchase over the counter).  Repeat Vitamin D level as ordered.  Ordered:  1160F- Medication reconciliation completed during visit, HLD (hyperlipidemia)  Osteopenia  Vitamin D deficiency, [facility], 01/08/20 9:14:00 CST  Office/Outpatient Visit Level 3 Established [id number] PC, HLD (hyperlipidemia)  Osteopenia  Vitamin D deficiency, [facility], 01/08/20 9:14:00 CST  Vitamin D, 25-Hydroxy Level, Routine collect, *Est. 07/08/20 3:00:00 CDT, Blood, Order for future visit, *Est. Stop date 07/08/20 3:00:00 CDT, Lab Collect, Vitamin D deficiency, 01/08/20 9:17:00 CST  ?  RTC 6 months and prn. Labs one week prior to appt.  Referrals  Clinic Follow up, *Est. 07/08/20 3:00:00 CDT, Order for future visit, HLD (hyperlipidemia)  Osteopenia, [facility]   Problem List/Past Medical History  Ongoing  HLD (hyperlipidemia)  Osteopenia  Well adult exam  Historical  Elevated lipids  Procedure/Surgical History  Abdominal hysterectomy   Medications  cyclobenzaprine 5 mg oral tablet, 5 mg= 1 tab(s), Oral, TID,? ?Not taking  Omega-3 oral capsule, BID,? ?Not taking: stop taking 3 weeks ago  One-A-Day 50+, 1 tab(s), Oral, Daily  Vitamin D, Oral  Allergies  No Known Allergies  Social History  Abuse/Neglect  No, No, Yes, 01/08/2020  Alcohol  Never, 10/08/2015  Employment/School  Unemployed, 01/25/2017  Unemployed, Work/School description: for about 5 months., 12/21/2016  Employed, 10/08/2015  Exercise  Exercise duration: 30. Exercise frequency: Daily. Exercise type: Walking, ROM., 01/08/2020  Home/Environment  Lives with Spouse. Living situation: Home/Independent. Alcohol abuse in household: No. Substance abuse in household: No. Smoker in household: No. Feels unsafe at home: No. Safe place to go: Yes. Family/Friends available for support: Yes., 01/25/2017  Nutrition/Health  Vegetarian, 01/25/2017  Other  Sexual  Spiritual/Cultural  Buddhism, Yes, 06/26/2019  Substance Use  Never, 10/08/2015  Tobacco  Never (less than 100 in  lifetime), No, Household tobacco concerns: No. Smokeless Tobacco Use: Never., 01/08/2020  Family History  Hypertension.: Mother.  Immunizations  Vaccine Date Status   influenza virus vaccine, inactivated 11/06/2019 Given   influenza virus vaccine, inactivated 10/15/2018 Given   tetanus/diphtheria/pertussis, acel(Tdap) 11/21/2016 Given   influenza virus vaccine, inactivated 11/21/2016 Given   Health Maintenance  Health Maintenance  ???Pending?(in the next year)  ??? ??OverDue  ??? ? ? ?Diabetes Screening due??and every?  ??? ? ? ?Colorectal Screening due??10/16/19??and every 1??year(s)  ??? ? ? ?Breast Cancer Screening due??10/27/19??and every 2??year(s)  ??? ??Due In Future?  ??? ? ? ?Alcohol Misuse Screening not due until??01/01/21??and every 1??year(s)  ??? ? ? ?Obesity Screening not due until??01/01/21??and every 1??year(s)  ??? ? ? ?Blood Pressure Screening not due until??01/07/21??and every 1??year(s)  ??? ? ? ?Body Mass Index Check not due until??01/07/21??and every 1??year(s)  ??? ? ? ?Depression Screening not due until??01/07/21??and every 1??year(s)  ???Satisfied?(in the past 1 year)  ??? ??Satisfied?  ??? ? ? ?ADL Screening on??01/08/20.??Satisfied by Day BOB Yumiko S.  ??? ? ? ?Alcohol Misuse Screening on??01/08/20.??Satisfied by Carol Joseph NP  ??? ? ? ?Blood Pressure Screening on??01/08/20.??Satisfied by Day BOB Yumiko S.  ??? ? ? ?Body Mass Index Check on??01/08/20.??Satisfied by Day BOB, Yumiko S.  ??? ? ? ?Cervical Cancer Screening on??11/06/19.??Satisfied by Jasen, Soumya  ??? ? ? ?Depression Screening on??01/08/20.??Satisfied by Yumiko Serrano LPN  ??? ? ? ?Diabetes Screening on??01/08/20.??Satisfied by Kelly Massey  ??? ? ? ?Influenza Vaccine on??11/06/19.??Satisfied by Tiffanie Burkett LPN  ??? ? ? ?Lipid Screening on??01/08/20.??Satisfied by González Peña Jr.  ??? ? ? ?Obesity Screening on??01/08/20.??Satisfied by Yumiko Serrano LPN  ?  Lab Results  Test Name Test  Result Date/Time   Sodium Lvl 142 mmol/L 01/08/2020 07:31 CST   Potassium Lvl 4.2 mmol/L 01/08/2020 07:31 CST   Chloride 108 mmol/L (High) 01/08/2020 07:31 CST   CO2 29 mmol/L 01/08/2020 07:31 CST   Calcium Lvl 8.8 mg/dL 01/08/2020 07:31 CST   Glucose Lvl 85 mg/dL 01/08/2020 07:31 CST    mg/dL (High) 01/08/2020 07:31 CST   BUN 10 mg/dL 01/08/2020 07:31 CST   Creatinine 0.60 mg/dL 01/08/2020 07:31 CST   eGFR-CHRISTIN >105 mL/min 01/08/2020 07:31 CST   Bili Total 0.5 mg/dL 01/08/2020 07:31 CST   Bili Direct 0.1 mg/dL 01/08/2020 07:31 CST   Bili Indirect 0.4 mg/dL 01/08/2020 07:31 CST   AST 23 unit/L 01/08/2020 07:31 CST   ALT 26 unit/L 01/08/2020 07:31 CST   Alk Phos 62 unit/L 01/08/2020 07:31 CST   Total Protein 7.2 gm/dL 01/08/2020 07:31 CST   Albumin Lvl 3.8 gm/dL 01/08/2020 07:31 CST   Globulin 3.40 gm/mL 01/08/2020 07:31 CST   A/G Ratio 1.1 ratio 01/08/2020 07:31 CST   Hgb A1c 5.7 % 01/08/2020 07:31 CST   Chol 230 mg/dL (High) 01/08/2020 07:31 CST   HDL 87 mg/dL (High) 01/08/2020 07:31 CST   Trig 158 mg/dL (High) 01/08/2020 07:31 CST    mg/dL 01/08/2020 07:31 CST   Chol/HDL 2.6 01/08/2020 07:31 CST   VLDL 32 mg/dL (High) 01/08/2020 07:31 CST   TSH 0.647 mIU/L 01/08/2020 07:31 CST   WBC 5.8 x10(3)/mcL 01/08/2020 07:31 CST   RBC 4.70 x10(6)/mcL 01/08/2020 07:31 CST   Hgb 13.3 gm/dL 01/08/2020 07:31 CST   Hct 42.7 % 01/08/2020 07:31 CST   Platelet 278 x10(3)/mcL 01/08/2020 07:31 CST   MCV 90.9 fL 01/08/2020 07:31 CST   MCH 28.3 pg 01/08/2020 07:31 CST   MCHC 31.1 gm/dL 01/08/2020 07:31 CST   RDW 12.7 % 01/08/2020 07:31 CST   MPV 8.3 fL 01/08/2020 07:31 CST   Abs Neut 3.04 x10(3)/mcL 01/08/2020 07:31 CST   Neutro Auto 53 % 01/08/2020 07:31 CST   Lymph Auto 35 % 01/08/2020 07:31 CST   Mono Auto 6 % 01/08/2020 07:31 CST   Eos Auto 5 % 01/08/2020 07:31 CST   Abs Eos 0.3 x10(3)/mcL 01/08/2020 07:31 CST   Basophil Auto 0 % 01/08/2020 07:31 CST   Abs Neutro 3.04 x10(3)/mcL 01/08/2020 07:31 CST   Abs Lymph 2.0  x10(3)/mcL 01/08/2020 07:31 CST   Abs Mono 0.4 x10(3)/mcL 01/08/2020 07:31 CST   Abs Baso 0.0 x10(3)/mcL 01/08/2020 07:31 CST   IG% 0 % 01/08/2020 07:31 CST   IG# 0.0200 01/08/2020 07:31 CST   Occult Bld IA Negative UA 10/16/2018 08:00 CDT   UA Appear Cloudy 01/08/2020 07:35 CST   UA Color YELLOW 01/08/2020 07:35 CST   UA Spec Grav 1.012 01/08/2020 07:35 CST   UA Bili Negative 01/08/2020 07:35 CST   UA pH 7.5 01/08/2020 07:35 CST   UA Urobilinogen Normal 01/08/2020 07:35 CST   UA Blood Negative 01/08/2020 07:35 CST   UA Glucose Negative 01/08/2020 07:35 CST   UA Ketones Negative 01/08/2020 07:35 CST   UA Protein Negative 01/08/2020 07:35 CST   UA Nitrite Negative 01/08/2020 07:35 CST   UA Leuk Est Negative 01/08/2020 07:35 CST   UA WBC Interp 0-2 01/08/2020 07:35 CST   UA RBC Interp 3-5 (Abnormal) 01/08/2020 07:35 CST   UA Bact Interp None Seen 01/08/2020 07:35 CST   UA Squam Epi Interp 2-20 01/08/2020 07:35 CST   Diagnostic Results  (06/27/2019 12:33 CDT XR Bone Density Complete)  Reason For Exam  osteopenia;Other (please specify)  ?  Radiology Report  DEXA scan  ?  INDICATION: Osteoporosis screening  ?  COMPARISON: 12/14/2016  ?  FINDINGS:  ?  Bone mineral density in the lumbar spine (L1-L4) measures 1.046 is  corresponds to a T score of -1.2. This has decreased from the prior  study.  ?  Bone mineral density in the femurs measures 0.786 which corresponds to  a T score of -1.8. This is unchanged from the prior study.  ?  IMPRESSION:  ?  Osteopenia in the lumbar spine according to WHO criteria  ?  ?  Signature Line  Electronically Signed By: Aly Sanchez MD  Date/Time Signed: 06/27/2019 12:42  ? [1]     [1]?XR Bone Density Complete; Aly Sanchez MD 06/27/2019 12:33 CDT

## 2022-05-03 NOTE — HISTORICAL [facility]
This is a historical note converted from [employer]. Formatting and pictures may have been removed.  Please reference Cerradha for original formatting and attached multimedia. Chief Complaint  Need PCP,c/o dizzy,weak  History of Present Illness  Pt is a?53 Years?old? Female here to?re-establish?PCP in [facility], former JUANA Acevedo NP pt. Saw?Kelley Gil NP for a one-time visit on 10/15/18. ?PMH palpitations, osteopenia?and elevated lipids. Seen in [facility] about 2 weeks ago for dizziness that persists, relieved with meclizine. Reports weakness almost daily?that is improved with candy or milk with sugar in about 10 minutes.??Reports mostly vegetarian but eats meat sometimes. States only eats green beans for breakfast then exercises, eats lunch 4 hours later that?consists of one bowl of rice with vegetables and same for supper. Reports weakness presents mid afternoon. Eats meat every once in a while; does not eat any beans, oatmeal, bread, yogurt or nuts.?Denies chest pain, shortness of breath,?cough, fever, headache, abdominal pain, nausea,?vomiting, diarrhea, constipation, dysuria, depression, anxiety, SI/HI.?  Review of Systems  Constitutional: negative except as stated in HPI  Eye: negative except as stated in HPI  ENT: negative except as stated in HPI  Respiratory: negative except as stated in HPI  Cardiovascular: negative except as stated in HPI  Gastrointestinal: negative except as stated in HPI  Genitourinary: negative except as stated in HPI  Heme/Lymph: negative except as stated in HPI  Endocrine: negative except as stated in HPI  Immunologic: negative except as stated in HPI  Musculoskeletal: negative except as stated in HPI  Integumentary: negative except as stated in HPI  Neurologic: negative except as stated in HPI  ?   All Other ROS_ negative except as stated in HPI  Physical Exam  Vitals & Measurements  T:?36.5? ?C (Oral)? HR:?63(Peripheral)? RR:?18? BP:?114/77?  HT:?157?cm? HT:?157?cm? WT:?47.1?kg? WT:?47.1?kg?  BMI:?19.11?  General: Alert and oriented, No acute distress.  Head: Normocephalic, Atraumatic.  Eye: Pupils are equal, round and reactive to light, Extraocular movements are intact, Normal conjunctiva, Sclera non-icteric.  Ears/Nose/Throat: Normal hearing, Oral mucosa is moist, No pharyngeal erythema.  Neck/Thyroid: Supple, Non-tender, No lymphadenopathy, No thyromegaly, No carotid bruit, No JVD, Full range of motion.  Respiratory: Clear to auscultation bilaterally, Non-labored Respirations, Breath sounds are equal, Symmetrical chest wall expansion, No wheezes, rales or rhonchi.  Cardiovascular: Regular rate and rhythm, Normal S1/S2, No murmurs, rubs or gallops.?Normal peripheral perfusion, No edema.  Gastrointestinal: Soft, Non-tender, Non-distended, Normal bowel sounds, No palpable organomegaly.  Genitourinary: No costovertebral angle tenderness, No inguinal tenderness.  Musculoskeletal: Normal range of motion, Normal strength, No tenderness, Normal gait.  Integumentary: Warm, Dry, Intact, No suspicious lesions or rashes.  Neurologic: No focal deficits, Cranial Nerves II-XII are grossly intact, Motor strength normal upper and lower extremities, Sensory exam intact.  Psychiatric: Normal interaction, Coherent speech, Euthymic mood, Appropriate affect.  Feet: 2+ pedal pulses bilaterally.  Assessment/Plan  1.?Osteopenia  ?Encouraged to restart OTC calcium-vitamin D supplement.  Continue MVI daily.  Encouraged to increase calcium rich foods in diet.  Fall prevention measures to prevent fractures.  Ordered:  Clinic Follow up, *Est. 02/01/19 3:00:00 CST, Order for future visit, HLD (hyperlipidemia)  Vertigo  Osteopenia  Weakness  Wellness examination, [facility]  Office/Outpatient Visit Level 4 Established [id number] PC, HLD (hyperlipidemia)  Vertigo  Osteopenia  Weakness  Wellness examination, [facility], 11/01/18 9:11:00 CDT  ?  2.?Elevated lipids,?HLD (hyperlipidemia),?HLD (hyperlipidemia),?HLD  (hyperlipidemia)  ?LDL?162 on?3/21/2017.?  FLP ordered today; pt fasting.  Follow a low cholesterol, low saturated fat diet with less than 200 mg of cholesterol a day.?  Avoid fried foods and high saturated fats (zuniga, sausage, cookies, cakes, chips, cheese, whole milk, butter, mayonnaise, creamy dressings, gravy and cream sauces).  Add flax seed or fish oil supplements to diet.?  Increase dietary fiber.?  Continue regular exercise improves cholesterol levels.  Physical activity 5 times a week for 30 minutes per day (or 150 minutes per week).?  Stressed importance of dietary modifications.  Ordered:  Clinic Follow up, *Est. 02/01/19 3:00:00 CST, Order for future visit, HLD (hyperlipidemia)  Vertigo  Osteopenia  Weakness  Wellness examination, [facility]  Comprehensive Metabolic Panel, Routine collect, 11/01/18 9:41:00 CDT, Blood, Stop date 11/01/18 9:41:00 CDT, Lab Collect, Venous Draw, Weakness  HLD (hyperlipidemia), Print Label By Order Location, 11/01/18 9:12:00 CDT  Lipid Panel, Routine collect, 11/01/18 9:41:00 CDT, Blood, Stop date 11/01/18 9:41:00 CDT, Lab Collect, Venous Draw, HLD (hyperlipidemia), Print Label By Order Location, 11/01/18 9:12:00 CDT  Office/Outpatient Visit Level 4 Established [id number] PC, HLD (hyperlipidemia)  Vertigo  Osteopenia  Weakness  Wellness examination, [facility], 11/01/18 9:11:00 CDT  ?  3.?Vertigo,?Vertigo  ?Continue Meclizine as prescribed.  Referral to Audiology and ENT for evaluation/treatment.  Ordered:  Clinic Follow up, *Est. 02/01/19 3:00:00 CST, Order for future visit, HLD (hyperlipidemia)  Vertigo  Osteopenia  Weakness  Wellness examination, [facility]  Internal Referral to Audiology, [place], 11/01/18 9:11:00 CDT, eval for vertigo, Vertigo  Internal Referral to ENT, vertigo, *Est. 12/01/18 3:00:00 CST, Future Visit?, Vertigo  Office/Outpatient Visit Level 4 Established [id number] PC, HLD (hyperlipidemia)  Vertigo  Osteopenia  Weakness  Wellness  examination, [facility], 11/01/18 9:11:00 CDT  ?  4.?Visit for screening mammogram  ?MMG on 10/27/17-birads 1.  MMG ordered.  Ordered:  MG Screening, Routine, 11/01/18 9:11:00 CDT, Screening, None, Ambulatory, Rad Type, Order for future visit, Screening for malignant neoplasm of breast, Schedule this test, [facility], 11/01/18 9:11:00 CDT  ?  Weakness,?Weakness  ?Encouraged to eat after exercising.  Add more complex carbs to diet and more frequent meals encouraged.  Ordered:  Clinic Follow up, *Est. 02/01/19 3:00:00 CST, Order for future visit, HLD (hyperlipidemia)  Vertigo  Osteopenia  Weakness  Wellness examination, [facility]  Comprehensive Metabolic Panel, Routine collect, 11/01/18 9:41:00 CDT, Blood, Stop date 11/01/18 9:41:00 CDT, Lab Collect, Venous Draw, Weakness  HLD (hyperlipidemia), Print Label By Order Location, 11/01/18 9:12:00 CDT  Office/Outpatient Visit Level 4 Established [id number] PC, HLD (hyperlipidemia)  Vertigo  Osteopenia  Weakness  Wellness examination, [facility], 11/01/18 9:11:00 CDT  ?  Wellness examination,?Wellness examination,?Wellness examination,?Wellness examination  ?MMG ordered.  Referral to GYN for annual exam.  FIT negative on 10/16/18.  Influenza vaccine given on 10/15/18.  Ordered:  Clinic Follow up, *Est. 02/01/19 3:00:00 CST, Order for future visit, HLD (hyperlipidemia)  Vertigo  Osteopenia  Weakness  Wellness examination, [facility]  Internal Referral to Gynecology, wellness exam, *Est. 12/01/18 3:00:00 CST, Future Visit?, Wellness examination  Office/Outpatient Visit Level 4 Established [id number] PC, HLD (hyperlipidemia)  Vertigo  Osteopenia  Weakness  Wellness examination, [facility], 11/01/18 9:11:00 CDT  Thyroid Stimulating Hormone, Routine collect, 11/01/18 9:41:00 CDT, Blood, Stop date 11/01/18 9:41:00 CDT, Lab Collect, Venous Draw, Wellness examination, Print Label By Order Location, 11/01/18 9:12:00 CDT  Urinalysis with  Microscopic if Indicated, Routine collect, Urine, 11/01/18 9:41:00 CDT by CLIENT, [id number], Stop date 11/01/18 9:41:00 CDT, Nurse collect, Urine, Wellness examination, ~[facility], 11/01/18 9:12:00 CDT  Vitamin D, 25-Hydroxy Level, Routine collect, 11/01/18 9:41:00 CDT, Blood, Stop date 11/01/18 9:41:00 CDT, Lab Collect, Venous Draw, Wellness examination, Print Label By Order Location, 11/01/18 9:12:00 CDT  ?  RTC 3 months and prn. Labs ordered today, will call with results.   Problem List/Past Medical History  Ongoing  Elevated lipids  Visit for screening mammogram  Well adult exam  Historical  No qualifying data  Procedure/Surgical History  Abdominal hysterectomy   Medications  meclizine 12.5 mg oral tablet, 12.5 mg= 1 tab(s), Oral, TID, PRN,? ?Not taking  One-A-Day 50+, 1 tab(s), Oral, Daily  Allergies  No Known Allergies  Social History  Alcohol  Never, 10/08/2015  Employment/School  Unemployed, 01/25/2017  Unemployed, Work/School description: for about 5 months., 12/21/2016  Employed, 10/08/2015  Home/Environment  Lives with Spouse. Living situation: Home/Independent. Alcohol abuse in household: No. Substance abuse in household: No. Smoker in household: No. Feels unsafe at home: No. Safe place to go: Yes. Family/Friends available for support: Yes., 01/25/2017  Lives with Alone. Living situation: Home/Independent. Alcohol abuse in household: No. Substance abuse in household: No. Smoker in household: No. Injuries/Abuse/Neglect in household: No. Feels unsafe at home: Yes. Safe place to go: Yes. Agency(s)/Others notified: No. Family/Friends available for support: Yes. Concern for family members at home: No. Major illness in household: No. Financial concerns: No. TV/Computer concerns: No., 11/21/2016  Lives with Alone., 10/08/2015  Nutrition/Health  Vegetarian, 01/25/2017  Other  Sexual  Substance Abuse  Never, 10/08/2015  Tobacco  Never smoker, No, Previous treatment: None. Ready to change: No. Household tobacco  concerns: No., 11/01/2018  Family History  Hypertension.: Mother.  Immunizations  Vaccine Date Status   influenza virus vaccine, inactivated 10/15/2018 Given   tetanus/diphtheria/pertussis, acel(Tdap) 11/21/2016 Given   influenza virus vaccine, inactivated 11/21/2016 Given   Health Maintenance  Health Maintenance  ???Pending?(in the next year)  ??? ??OverDue  ??? ? ? ?Diabetes Screening due??and every?  ??? ? ? ?Influenza Vaccine due??and every?  ??? ??Due?  ??? ? ? ?ADL Screening due??11/01/18??and every 1??year(s)  ??? ??Due In Future?  ??? ? ? ?Colorectal Screening not due until??10/16/19??and every 1??year(s)  ??? ? ? ?Cervical Cancer Screening not due until??10/24/19??and every 3??year(s)  ??? ? ? ?Breast Cancer Screening not due until??10/27/19??and every 2??year(s)  ???Satisfied?(in the past 1 year)  ??? ??Satisfied?  ??? ? ? ?Alcohol Misuse Screening on??11/01/18.??Satisfied by Carol Joseph NP  ??? ? ? ?Blood Pressure Screening on??11/01/18.??Satisfied by Day Yumiko ROJAS S.  ??? ? ? ?Body Mass Index Check on??11/01/18.??Satisfied by Day Yumiko ROJAS S.  ??? ? ? ?Colorectal Screening on??10/16/18.??Satisfied by Fatoumata Infante  ??? ? ? ?Depression Screening on??11/01/18.??Satisfied by Day BOB Yumiko S.  ??? ? ? ?Diabetes Screening on??10/10/18.??Satisfied by Lyubov Horowitz  ??? ? ? ?Influenza Vaccine on??10/15/18.??Satisfied by María Parekh LPN  ??? ? ? ?Obesity Screening on??11/01/18.??Satisfied by Maggie ROJAS Yumiko S.  ?  ?  Lab Results  Test Name Test Result Date/Time   Hgb A1c 5.7 % 10/10/2018 18:15 CDT   Occult Bld IA Negative UA 10/16/2018 08:00 CDT

## 2022-07-11 ENCOUNTER — LAB VISIT (OUTPATIENT)
Dept: LAB | Facility: HOSPITAL | Age: 57
End: 2022-07-11
Attending: NURSE PRACTITIONER
Payer: MEDICAID

## 2022-07-11 DIAGNOSIS — E78.5 HYPERLIPIDEMIA, UNSPECIFIED HYPERLIPIDEMIA TYPE: Primary | ICD-10-CM

## 2022-07-11 LAB
CHOLEST SERPL-MCNC: 229 MG/DL
CHOLEST/HDLC SERPL: 3 {RATIO} (ref 0–5)
HDLC SERPL-MCNC: 72 MG/DL (ref 35–60)
LDLC SERPL CALC-MCNC: 138 MG/DL (ref 50–140)
TRIGL SERPL-MCNC: 96 MG/DL (ref 37–140)
VLDLC SERPL CALC-MCNC: 19 MG/DL

## 2022-07-11 PROCEDURE — 36415 COLL VENOUS BLD VENIPUNCTURE: CPT

## 2022-07-11 PROCEDURE — 83718 ASSAY OF LIPOPROTEIN: CPT

## 2022-07-12 ENCOUNTER — OFFICE VISIT (OUTPATIENT)
Dept: INTERNAL MEDICINE | Facility: CLINIC | Age: 57
End: 2022-07-12
Payer: MEDICAID

## 2022-07-12 VITALS
RESPIRATION RATE: 18 BRPM | HEART RATE: 71 BPM | BODY MASS INDEX: 18.3 KG/M2 | HEIGHT: 62 IN | WEIGHT: 99.44 LBS | SYSTOLIC BLOOD PRESSURE: 109 MMHG | DIASTOLIC BLOOD PRESSURE: 71 MMHG | TEMPERATURE: 98 F

## 2022-07-12 DIAGNOSIS — M85.88 OSTEOPENIA OF LUMBAR SPINE: ICD-10-CM

## 2022-07-12 DIAGNOSIS — Z23 NEED FOR VACCINATION: ICD-10-CM

## 2022-07-12 DIAGNOSIS — Z12.31 ENCOUNTER FOR SCREENING MAMMOGRAM FOR MALIGNANT NEOPLASM OF BREAST: ICD-10-CM

## 2022-07-12 DIAGNOSIS — Z00.00 ENCOUNTER FOR WELLNESS EXAMINATION IN ADULT: ICD-10-CM

## 2022-07-12 DIAGNOSIS — Z13.1 SCREENING FOR DIABETES MELLITUS: ICD-10-CM

## 2022-07-12 DIAGNOSIS — Z78.0 POSTMENOPAUSAL: ICD-10-CM

## 2022-07-12 DIAGNOSIS — E78.2 MIXED HYPERLIPIDEMIA: Primary | ICD-10-CM

## 2022-07-12 DIAGNOSIS — Z13.29 SCREENING FOR THYROID DISORDER: ICD-10-CM

## 2022-07-12 PROBLEM — M85.80 OSTEOPENIA: Status: ACTIVE | Noted: 2022-07-12

## 2022-07-12 PROBLEM — E78.5 HYPERLIPIDEMIA: Status: ACTIVE | Noted: 2022-07-12

## 2022-07-12 PROCEDURE — 99213 PR OFFICE/OUTPT VISIT, EST, LEVL III, 20-29 MIN: ICD-10-PCS | Mod: S$PBB,,, | Performed by: NURSE PRACTITIONER

## 2022-07-12 PROCEDURE — 1160F RVW MEDS BY RX/DR IN RCRD: CPT | Mod: CPTII,,, | Performed by: NURSE PRACTITIONER

## 2022-07-12 PROCEDURE — 1159F MED LIST DOCD IN RCRD: CPT | Mod: CPTII,,, | Performed by: NURSE PRACTITIONER

## 2022-07-12 PROCEDURE — 3074F SYST BP LT 130 MM HG: CPT | Mod: CPTII,,, | Performed by: NURSE PRACTITIONER

## 2022-07-12 PROCEDURE — 99213 OFFICE O/P EST LOW 20 MIN: CPT | Mod: S$PBB,,, | Performed by: NURSE PRACTITIONER

## 2022-07-12 PROCEDURE — 1160F PR REVIEW ALL MEDS BY PRESCRIBER/CLIN PHARMACIST DOCUMENTED: ICD-10-PCS | Mod: CPTII,,, | Performed by: NURSE PRACTITIONER

## 2022-07-12 PROCEDURE — 3078F DIAST BP <80 MM HG: CPT | Mod: CPTII,,, | Performed by: NURSE PRACTITIONER

## 2022-07-12 PROCEDURE — 3008F PR BODY MASS INDEX (BMI) DOCUMENTED: ICD-10-PCS | Mod: CPTII,,, | Performed by: NURSE PRACTITIONER

## 2022-07-12 PROCEDURE — 3078F PR MOST RECENT DIASTOLIC BLOOD PRESSURE < 80 MM HG: ICD-10-PCS | Mod: CPTII,,, | Performed by: NURSE PRACTITIONER

## 2022-07-12 PROCEDURE — 1159F PR MEDICATION LIST DOCUMENTED IN MEDICAL RECORD: ICD-10-PCS | Mod: CPTII,,, | Performed by: NURSE PRACTITIONER

## 2022-07-12 PROCEDURE — 99213 OFFICE O/P EST LOW 20 MIN: CPT | Mod: PBBFAC | Performed by: NURSE PRACTITIONER

## 2022-07-12 PROCEDURE — 90750 HZV VACC RECOMBINANT IM: CPT | Mod: PBBFAC

## 2022-07-12 PROCEDURE — 3008F BODY MASS INDEX DOCD: CPT | Mod: CPTII,,, | Performed by: NURSE PRACTITIONER

## 2022-07-12 PROCEDURE — 3074F PR MOST RECENT SYSTOLIC BLOOD PRESSURE < 130 MM HG: ICD-10-PCS | Mod: CPTII,,, | Performed by: NURSE PRACTITIONER

## 2022-07-12 RX ORDER — AMOXICILLIN 500 MG
CAPSULE ORAL DAILY
COMMUNITY

## 2022-07-12 RX ORDER — CHOLECALCIFEROL (VITAMIN D3) 25 MCG
1000 TABLET ORAL DAILY
COMMUNITY

## 2022-07-12 NOTE — ASSESSMENT & PLAN NOTE
Continue Calcium 600mg twice per day and Vitamin D 2000IU daily.   Maintain a healthy BMI of <30.  Weight bearing exercise such as walking or resistance training to build bones 5 times a week.  Perform strengthening, range of motion and low-impact aerobic exercises (walking, water aerobics, cycling) as recommended to control pain and improve joint function.  Avoid soda and excessive alcohol.  Use assistive devices as needed for ambulation and fall prevention.  Avoid falls by assessing home for loose cords and rugs, wearing proper footwear, and using proper lighting in rooms.  Repeat DEXA ordered.

## 2022-07-12 NOTE — ASSESSMENT & PLAN NOTE
/ Trig 96 / HDL - 72  / Total chol - 229.  Follow a low cholesterol, low saturated fat diet with less than 200 mg of cholesterol a day.   Avoid fried foods and high saturated fats (zuniga, sausage, cookies, cakes, chips, cheese, whole milk, butter, mayonnaise, creamy dressings, gravy and cream sauces).  Add flax seed or fish oil supplements to diet.   Increase dietary fiber.   Regular exercise improves cholesterol levels.  Physical activity 5 times a week for 30 minutes per day (or 150 minutes per week).   Stressed importance of dietary modifications.

## 2022-07-12 NOTE — PROGRESS NOTES
PATO Head   [facility]  [address] [address]      PATIENT NAME: Laura Alexander  : 1965  DATE: 22  MRN: [id number]      Billing Provider: PATO Head  Level of Service:   Patient PCP Information     Provider PCP Type    PATO Head General          Reason for Visit / Chief Complaint: Follow-up (Lab results, shingrix #2)       History of Present Illness / Problem Focused Workflow     Laura Alexander is a 56 y.o. [place] female presents to the clinic for f/u HLD. PMH palpitations, dizziness, osteopenia and HLD. Followed by [facility]YN clinic.  present during visit today and declines  services. Continues to eat mostly vegetarian, eats meat every once in a while. Continues to eat q2-3 hrs and drinks protein shakes. Exercises every morning for 20-30 mins. Reports feeling well. GYN visit is scheduled for Nov. Labs reviewed with pt/. Amendable to shingrix #2 today. Denies chest pain, shortness of breath, cough, fever, headache, dizziness, weakness, abdominal pain, nausea, vomiting, diarrhea, constipation, dysuria, depression, anxiety, SI/HI.    Cervical Cancer Screening - Last Pap on 21. Follow up with GYN Clinic for annual Pap/Pelvic.  Breast Cancer Screening - Last Mammogram on 21. Birads 1. MMG ordered Follow up annually.  Colon Cancer Screening - FIT negative on 10/19/21. Follow up annually.  Osteoporosis Screening - Last DEXA on 19. Results show osteopenia. Follow up in 2 years ()- reordered.  Eye Exam -   Dental Exam -  Vaccinations: Flu - 10/18/21/ covid 21 & 21 / Pneumonia - / Shingles -10/18/21 & 22 / Tetanus - UTD (16)  Labwork - UTD      Review of Systems     Review of Systems   Constitutional: Negative.    HENT: Negative.    Eyes: Negative.    Respiratory: Negative.    Cardiovascular: Negative.    Gastrointestinal: Negative.   "  Endocrine: Negative.    Genitourinary: Negative.    Musculoskeletal: Negative.    Integumentary:  Negative.   Neurological: Negative.    Psychiatric/Behavioral: Negative.         Medical / Social / Family History     Past Medical History:   Diagnosis Date    HLD (hyperlipidemia)        Past Surgical History:   Procedure Laterality Date    HYSTERECTOMY         Social History  Ms. Alexander  reports that she has never smoked. She has never used smokeless tobacco. She reports that she does not drink alcohol and does not use drugs.    Family History  Ms. Alexander's family history includes Hypertension in her mother.    Medications and Allergies     Medications  Medication List with Changes/Refills   Current Medications    CALCIUM-VITAMIN D 250 MG-2.5 MCG (100 UNIT) PER TABLET    Take 1 tablet by mouth Daily.    OMEGA-3 FATTY ACIDS/FISH OIL (FISH OIL-OMEGA-3 FATTY ACIDS) 300-1,000 MG CAPSULE    Take by mouth once daily.    VITAMIN D (VITAMIN D3) 1000 UNITS TAB    Take 1,000 Units by mouth once daily.         Allergies  Review of patient's allergies indicates:  No Known Allergies    Physical Examination   Vital Signs  Temp: 97.9 °F (36.6 °C)  Temp src: Oral  Pulse: 71  Resp: 18  BP: 109/71  BP Location: Left arm  Patient Position: Sitting  Height and Weight  Height: 5' 1.81" (157 cm)  Weight: 45.1 kg (99 lb 6.8 oz)  BSA (Calculated - sq m): 1.4 sq meters  BMI (Calculated): 18.3  Weight in (lb) to have BMI = 25: 135.6]   Physical Exam  Vitals reviewed.   Constitutional:       Appearance: Normal appearance.   HENT:      Head: Normocephalic.   Eyes:      Pupils: Pupils are equal, round, and reactive to light.   Cardiovascular:      Rate and Rhythm: Normal rate and regular rhythm.      Heart sounds: Normal heart sounds.   Pulmonary:      Effort: Pulmonary effort is normal.      Breath sounds: Normal breath sounds.   Abdominal:      General: Bowel sounds are normal.      Palpations: Abdomen is soft.   Skin:     General: Skin is " warm.   Neurological:      Mental Status: She is alert and oriented to person, place, and time.   Psychiatric:         Mood and Affect: Mood normal.         Speech: Speech normal.         Behavior: Behavior is cooperative.         Judgment: Judgment normal.           Results     Lab Results   Component Value Date    WBC 5.6 10/13/2021    RBC 4.71 10/13/2021    HGB 13.4 10/13/2021    HCT 41.9 10/13/2021    MCV 89.0 10/13/2021    MCH 28.5 10/13/2021    MCHC 32.0 10/13/2021    RDW 12.7 10/13/2021     10/13/2021    MPV 8.4 10/13/2021     CMP  Sodium Level   Date Value Ref Range Status   10/13/2021 141 136 - 145 mmol/L Final     Potassium Level   Date Value Ref Range Status   10/13/2021 4.3 3.5 - 5.1 mmol/L Final     Carbon Dioxide   Date Value Ref Range Status   10/13/2021 25 22 - 29 mmol/L Final     Blood Urea Nitrogen   Date Value Ref Range Status   10/13/2021 10.6 9.8 - 20.1 mg/dL Final     Creatinine   Date Value Ref Range Status   10/13/2021 0.67 0.55 - 1.02 mg/dL Final     Calcium Level Total   Date Value Ref Range Status   10/13/2021 9.4 8.4 - 10.2 mg/dL Final     Albumin Level   Date Value Ref Range Status   10/13/2021 3.9 3.5 - 5.0 gm/dL Final     Bilirubin Total   Date Value Ref Range Status   10/13/2021 0.7 <<=1.5 mg/dL Final     Alkaline Phosphatase   Date Value Ref Range Status   10/13/2021 59 40 - 150 unit/L Final     Aspartate Aminotransferase   Date Value Ref Range Status   10/13/2021 22 5 - 34 unit/L Final     Alanine Aminotransferase   Date Value Ref Range Status   10/13/2021 13 0 - 55 unit/L Final     Estimated GFR-Non    Date Value Ref Range Status   10/13/2021 97 >>=90 mL/min/1.73 m2 Final     Lab Results   Component Value Date    CHOL 229 (H) 07/11/2022     Lab Results   Component Value Date    HDL 72 (H) 07/11/2022     No results found for: LDLCALC  Lab Results   Component Value Date    TRIG 96 07/11/2022     No results found for: CHOLHDL  Lab Results   Component Value Date     TSH 0.9641 10/13/2021     Lab Results   Component Value Date    PHUR 7.5 10/13/2021    PROTEINUA Negative 10/13/2021    GLUCUA Negative 10/13/2021    KETONESU Negative 10/13/2021    OCCULTUA Negative 10/13/2021    NITRITE Negative 10/13/2021    LEUKOCYTESUR Negative 10/13/2021           Assessment and Plan (including Health Maintenance)     Plan: RTC 6 months and prn. Labs one week prior to appt.         Health Maintenance Due   Topic Date Due    Hepatitis C Screening  Never done    HIV Screening  Never done    Colorectal Cancer Screening  Never done    COVID-19 Vaccine (3 - Booster for Moderna series) 10/12/2021    Mammogram  06/16/2022       Problem List Items Addressed This Visit        Cardiac/Vascular    Hyperlipidemia - Primary    Current Assessment & Plan      / Trig 96 / HDL - 72  / Total chol - 229.  Follow a low cholesterol, low saturated fat diet with less than 200 mg of cholesterol a day.   Avoid fried foods and high saturated fats (zuniga, sausage, cookies, cakes, chips, cheese, whole milk, butter, mayonnaise, creamy dressings, gravy and cream sauces).  Add flax seed or fish oil supplements to diet.   Increase dietary fiber.   Regular exercise improves cholesterol levels.  Physical activity 5 times a week for 30 minutes per day (or 150 minutes per week).   Stressed importance of dietary modifications.                ID    Need for vaccination    Current Assessment & Plan     Shingrix #2 given.           Relevant Orders    (In Office Administered) Zoster Recombinant Vaccine (Completed)       Orthopedic    Osteopenia    Current Assessment & Plan     Continue Calcium 600mg twice per day and Vitamin D 2000IU daily.   Maintain a healthy BMI of <30.  Weight bearing exercise such as walking or resistance training to build bones 5 times a week.  Perform strengthening, range of motion and low-impact aerobic exercises (walking, water aerobics, cycling) as recommended to control pain and improve  joint function.  Avoid soda and excessive alcohol.  Use assistive devices as needed for ambulation and fall prevention.  Avoid falls by assessing home for loose cords and rugs, wearing proper footwear, and using proper lighting in rooms.  Repeat DEXA ordered.                  Other Visit Diagnoses     Postmenopausal        Relevant Orders    DXA Bone Density Appendicular Skeleton    Encounter for screening mammogram for malignant neoplasm of breast        Relevant Orders    Mammo Digital Screening Bilat    Encounter for wellness examination in adult        Relevant Orders    CBC Auto Differential    Comprehensive Metabolic Panel    Urinalysis, Reflex to Urine Culture Urine, Clean Catch    Screening for diabetes mellitus        Relevant Orders    Hemoglobin A1C    Screening for thyroid disorder        Relevant Orders    TSH          Health Maintenance Topics with due status: Not Due       Topic Last Completion Date    TETANUS VACCINE 11/21/2016    Influenza Vaccine 10/18/2021    Lipid Panel 07/11/2022       Future Appointments   Date Time Provider Department Center   11/15/2022 12:30 PM HUDSON De Guzman [facility]            Signature:  PATO Head  [facility]  [address] [id number]    Date of encounter: 7/12/22

## 2022-08-30 ENCOUNTER — HOSPITAL ENCOUNTER (OUTPATIENT)
Dept: RADIOLOGY | Facility: HOSPITAL | Age: 57
Discharge: HOME | End: 2022-08-30
Attending: NURSE PRACTITIONER
Payer: MEDICAID

## 2022-08-30 DIAGNOSIS — Z12.31 ENCOUNTER FOR SCREENING MAMMOGRAM FOR MALIGNANT NEOPLASM OF BREAST: ICD-10-CM

## 2022-08-30 DIAGNOSIS — Z78.0 POSTMENOPAUSAL: ICD-10-CM

## 2022-08-30 PROCEDURE — 77067 MAMMO DIGITAL SCREENING BILAT WITH TOMO: ICD-10-PCS | Mod: 26,,, | Performed by: RADIOLOGY

## 2022-08-30 PROCEDURE — 77067 SCR MAMMO BI INCL CAD: CPT | Mod: 26,,, | Performed by: RADIOLOGY

## 2022-08-30 PROCEDURE — 77067 SCR MAMMO BI INCL CAD: CPT | Mod: TC

## 2022-08-30 PROCEDURE — 77063 BREAST TOMOSYNTHESIS BI: CPT | Mod: 26,,, | Performed by: RADIOLOGY

## 2022-08-30 PROCEDURE — 77063 MAMMO DIGITAL SCREENING BILAT WITH TOMO: ICD-10-PCS | Mod: 26,,, | Performed by: RADIOLOGY

## 2022-08-30 PROCEDURE — 77081 DXA BONE DENSITY APPENDICULR: CPT | Mod: TC

## 2024-01-19 ENCOUNTER — HOSPITAL ENCOUNTER (EMERGENCY)
Facility: HOSPITAL | Age: 59
Discharge: HOME | End: 2024-01-20
Attending: FAMILY MEDICINE

## 2024-01-19 DIAGNOSIS — R42 DIZZINESS: ICD-10-CM

## 2024-01-19 LAB
ALBUMIN SERPL-MCNC: 4.3 G/DL (ref 3.5–5)
ALBUMIN/GLOB SERPL: 1.4 RATIO (ref 1.1–2)
ALP SERPL-CCNC: 65 UNIT/L (ref 40–150)
ALT SERPL-CCNC: 14 UNIT/L (ref 0–55)
AST SERPL-CCNC: 19 UNIT/L (ref 5–34)
BASOPHILS # BLD AUTO: 0.03 X10(3)/MCL
BASOPHILS NFR BLD AUTO: 0.4 %
BILIRUB SERPL-MCNC: 0.7 MG/DL
BNP BLD-MCNC: 40.6 PG/ML
BUN SERPL-MCNC: 12 MG/DL (ref 9.8–20.1)
CALCIUM SERPL-MCNC: 9.3 MG/DL (ref 8.4–10.2)
CHLORIDE SERPL-SCNC: 106 MMOL/L (ref 98–107)
CK MB SERPL-MCNC: 1 NG/ML
CK SERPL-CCNC: 98 U/L (ref 29–168)
CO2 SERPL-SCNC: 25 MMOL/L (ref 22–29)
CREAT SERPL-MCNC: 0.76 MG/DL (ref 0.55–1.02)
EOSINOPHIL # BLD AUTO: 0.09 X10(3)/MCL (ref 0–0.9)
EOSINOPHIL NFR BLD AUTO: 1.1 %
ERYTHROCYTE [DISTWIDTH] IN BLOOD BY AUTOMATED COUNT: 12.4 % (ref 11.5–17)
GFR SERPLBLD CREATININE-BSD FMLA CKD-EPI: >60 MLS/MIN/1.73/M2
GLOBULIN SER-MCNC: 3.1 GM/DL (ref 2.4–3.5)
GLUCOSE SERPL-MCNC: 96 MG/DL (ref 74–100)
HCT VFR BLD AUTO: 40.8 % (ref 37–47)
HGB BLD-MCNC: 13.4 G/DL (ref 12–16)
IMM GRANULOCYTES # BLD AUTO: 0.02 X10(3)/MCL (ref 0–0.04)
IMM GRANULOCYTES NFR BLD AUTO: 0.3 %
LYMPHOCYTES # BLD AUTO: 1.9 X10(3)/MCL (ref 0.6–4.6)
LYMPHOCYTES NFR BLD AUTO: 24.3 %
MAGNESIUM SERPL-MCNC: 2.2 MG/DL (ref 1.6–2.6)
MCH RBC QN AUTO: 28.8 PG (ref 27–31)
MCHC RBC AUTO-ENTMCNC: 32.8 G/DL (ref 33–36)
MCV RBC AUTO: 87.7 FL (ref 80–94)
MONOCYTES # BLD AUTO: 0.4 X10(3)/MCL (ref 0.1–1.3)
MONOCYTES NFR BLD AUTO: 5.1 %
NEUTROPHILS # BLD AUTO: 5.39 X10(3)/MCL (ref 2.1–9.2)
NEUTROPHILS NFR BLD AUTO: 68.8 %
NRBC BLD AUTO-RTO: 0 %
PLATELET # BLD AUTO: 298 X10(3)/MCL (ref 130–400)
PMV BLD AUTO: 8.2 FL (ref 7.4–10.4)
POTASSIUM SERPL-SCNC: 3.5 MMOL/L (ref 3.5–5.1)
PROT SERPL-MCNC: 7.4 GM/DL (ref 6.4–8.3)
RBC # BLD AUTO: 4.65 X10(6)/MCL (ref 4.2–5.4)
SODIUM SERPL-SCNC: 140 MMOL/L (ref 136–145)
TROPONIN I SERPL-MCNC: <0.01 NG/ML (ref 0–0.04)
WBC # SPEC AUTO: 7.83 X10(3)/MCL (ref 4.5–11.5)

## 2024-01-19 PROCEDURE — 82550 ASSAY OF CK (CPK): CPT | Performed by: FAMILY MEDICINE

## 2024-01-19 PROCEDURE — 83735 ASSAY OF MAGNESIUM: CPT | Performed by: FAMILY MEDICINE

## 2024-01-19 PROCEDURE — 85025 COMPLETE CBC W/AUTO DIFF WBC: CPT | Performed by: FAMILY MEDICINE

## 2024-01-19 PROCEDURE — 93005 ELECTROCARDIOGRAM TRACING: CPT

## 2024-01-19 PROCEDURE — 99285 EMERGENCY DEPT VISIT HI MDM: CPT | Mod: 25

## 2024-01-19 PROCEDURE — 80053 COMPREHEN METABOLIC PANEL: CPT | Performed by: FAMILY MEDICINE

## 2024-01-19 PROCEDURE — 84484 ASSAY OF TROPONIN QUANT: CPT | Performed by: FAMILY MEDICINE

## 2024-01-19 PROCEDURE — 0240U COVID/FLU A&B PCR: CPT | Performed by: FAMILY MEDICINE

## 2024-01-19 PROCEDURE — 82553 CREATINE MB FRACTION: CPT | Performed by: FAMILY MEDICINE

## 2024-01-19 PROCEDURE — 81001 URINALYSIS AUTO W/SCOPE: CPT | Performed by: FAMILY MEDICINE

## 2024-01-19 PROCEDURE — 83880 ASSAY OF NATRIURETIC PEPTIDE: CPT | Performed by: FAMILY MEDICINE

## 2024-01-19 RX ORDER — MECLIZINE HYDROCHLORIDE 25 MG/1
25 TABLET ORAL
Status: COMPLETED | OUTPATIENT
Start: 2024-01-20 | End: 2024-01-20

## 2024-01-20 VITALS
DIASTOLIC BLOOD PRESSURE: 82 MMHG | RESPIRATION RATE: 20 BRPM | SYSTOLIC BLOOD PRESSURE: 130 MMHG | BODY MASS INDEX: 19.6 KG/M2 | WEIGHT: 103.81 LBS | OXYGEN SATURATION: 100 % | TEMPERATURE: 98 F | HEART RATE: 58 BPM | HEIGHT: 61 IN

## 2024-01-20 LAB
APPEARANCE UR: CLEAR
BACTERIA #/AREA URNS AUTO: ABNORMAL /HPF
BILIRUB UR QL STRIP.AUTO: NEGATIVE
COLOR UR AUTO: ABNORMAL
FLUAV AG UPPER RESP QL IA.RAPID: NOT DETECTED
FLUBV AG UPPER RESP QL IA.RAPID: NOT DETECTED
GLUCOSE UR QL STRIP.AUTO: NORMAL
HOLD SPECIMEN: NORMAL
HYALINE CASTS #/AREA URNS LPF: ABNORMAL /LPF
KETONES UR QL STRIP.AUTO: ABNORMAL
LEUKOCYTE ESTERASE UR QL STRIP.AUTO: NEGATIVE
MUCOUS THREADS URNS QL MICRO: ABNORMAL /LPF
NITRITE UR QL STRIP.AUTO: NEGATIVE
PH UR STRIP.AUTO: 6 [PH]
PROT UR QL STRIP.AUTO: NEGATIVE
RBC #/AREA URNS AUTO: ABNORMAL /HPF
RBC UR QL AUTO: NEGATIVE
SARS-COV-2 RNA RESP QL NAA+PROBE: NOT DETECTED
SP GR UR STRIP.AUTO: 1.02 (ref 1–1.03)
SQUAMOUS #/AREA URNS LPF: ABNORMAL /HPF
UROBILINOGEN UR STRIP-ACNC: NORMAL
WBC #/AREA URNS AUTO: ABNORMAL /HPF

## 2024-01-20 PROCEDURE — 63600175 PHARM REV CODE 636 W HCPCS: Performed by: FAMILY MEDICINE

## 2024-01-20 PROCEDURE — 96360 HYDRATION IV INFUSION INIT: CPT

## 2024-01-20 PROCEDURE — 25000003 PHARM REV CODE 250: Performed by: FAMILY MEDICINE

## 2024-01-20 RX ORDER — MECLIZINE HCL 12.5 MG 12.5 MG/1
12.5 TABLET ORAL 3 TIMES DAILY PRN
Qty: 20 TABLET | Refills: 0 | Status: SHIPPED | OUTPATIENT
Start: 2024-01-20

## 2024-01-20 RX ADMIN — SODIUM CHLORIDE, POTASSIUM CHLORIDE, SODIUM LACTATE AND CALCIUM CHLORIDE 1000 ML: 600; 310; 30; 20 INJECTION, SOLUTION INTRAVENOUS at 12:01

## 2024-01-20 RX ADMIN — MECLIZINE HYDROCHLORIDE 25 MG: 25 TABLET ORAL at 12:01

## 2024-01-20 NOTE — ED PROVIDER NOTES
Encounter Date: 1/19/2024       History     Chief Complaint   Patient presents with    Dizziness     States dizziness after laying down awhile.  States last 4-5 days consistently.  Has happened occasionally in the past.         Patient is a pleasant 58-year-old female presents emergency room complaints of dizziness.  Patient reports that symptoms began intermittently, but has been more consistent over the last 4-5 days.  Symptoms are worse when lying down, and also going from sitting to standing.  Patient denies chest pain.  Denies abdominal pain.  Does report increased fatigue recently.    The history is provided by the patient.     Review of patient's allergies indicates:  No Known Allergies  Past Medical History:   Diagnosis Date    HLD (hyperlipidemia)      Past Surgical History:   Procedure Laterality Date    HYSTERECTOMY       Family History   Problem Relation Age of Onset    Hypertension Mother      Social History     Tobacco Use    Smoking status: Never    Smokeless tobacco: Never   Substance Use Topics    Alcohol use: Never    Drug use: Never     Review of Systems   Constitutional:  Positive for fatigue. Negative for chills and fever.   HENT:  Negative for ear pain, rhinorrhea and sore throat.    Eyes:  Negative for photophobia and pain.   Respiratory:  Negative for cough, shortness of breath and wheezing.    Cardiovascular:  Negative for chest pain.   Gastrointestinal:  Negative for abdominal pain, diarrhea, nausea and vomiting.   Genitourinary:  Negative for dysuria.   Neurological:  Positive for dizziness, weakness and light-headedness. Negative for headaches.   All other systems reviewed and are negative.      Physical Exam     Initial Vitals [01/19/24 2159]   BP Pulse Resp Temp SpO2   (!) 146/83 63 17 98.4 °F (36.9 °C) 100 %      MAP       --         Physical Exam    Nursing note and vitals reviewed.  Constitutional: She appears well-developed and well-nourished. No distress.   HENT:   Head:  Normocephalic and atraumatic.   Right Ear: External ear normal.   Left Ear: External ear normal.   Mouth/Throat: Oropharynx is clear and moist.     No appreciable horizontal nystagmus   Eyes: Conjunctivae and EOM are normal. Pupils are equal, round, and reactive to light.   Neck: Neck supple.   Normal range of motion.  Cardiovascular:  Normal rate, regular rhythm and intact distal pulses.     Exam reveals gallop. Exam reveals no friction rub.       No murmur heard.  Pulmonary/Chest: No respiratory distress. She has no wheezes. She has no rhonchi. She has no rales.   Abdominal: Abdomen is soft. Bowel sounds are normal. She exhibits no distension. There is no abdominal tenderness. There is no rebound and no guarding.   Musculoskeletal:         General: Normal range of motion.      Cervical back: Normal range of motion and neck supple.     Neurological: She is alert and oriented to person, place, and time.   Skin: Skin is warm and dry. Capillary refill takes less than 2 seconds. No erythema.   Psychiatric: She has a normal mood and affect. Her behavior is normal. Judgment and thought content normal.         ED Course   Procedures  Labs Reviewed   URINALYSIS, REFLEX TO URINE CULTURE - Abnormal; Notable for the following components:       Result Value    Ketones, UA 1+ (*)     Squamous Epithelial Cells, UA Trace (*)     Mucous, UA Trace (*)     All other components within normal limits   CBC WITH DIFFERENTIAL - Abnormal; Notable for the following components:    MCHC 32.8 (*)     All other components within normal limits   CK - Normal   CK-MB - Normal   TROPONIN I - Normal   B-TYPE NATRIURETIC PEPTIDE - Normal   COVID/FLU A&B PCR - Normal    Narrative:     The Xpert Xpress SARS-CoV-2/FLU/RSV plus is a rapid, multiplexed real-time PCR test intended for the simultaneous qualitative detection and differentiation of SARS-CoV-2, Influenza A, Influenza B, and respiratory syncytial virus (RSV) viral RNA in either nasopharyngeal  swab or nasal swab specimens.         MAGNESIUM - Normal   CBC W/ AUTO DIFFERENTIAL    Narrative:     The following orders were created for panel order CBC Auto Differential.  Procedure                               Abnormality         Status                     ---------                               -----------         ------                     CBC with Differential[[id number]]       Abnormal            Final result                 Please view results for these tests on the individual orders.   COMPREHENSIVE METABOLIC PANEL   EXTRA TUBES    Narrative:     The following orders were created for panel order EXTRA TUBES.  Procedure                               Abnormality         Status                     ---------                               -----------         ------                     Gold Top Hold[[id number]]                                   Final result                 Please view results for these tests on the individual orders.   GOLD TOP HOLD     EKG Readings: (Independently Interpreted)   My Independent EKG Interpretation  01/19/2024 11:26 PM  Rate: 61 bpm  Rhythm: Sinus  Axis: Normal  Intervals: Normal  ST Changes: None  Impression: Normal sinus rhythm, normal EKG           Imaging Results              CT Head Without Contrast (Preliminary result)  Result time 01/19/24 23:31:34      Preliminary result by Sang Frank MD (01/19/24 23:31:34)                   Narrative:    START OF REPORT:  Technique: CT of the head was performed without intravenous contrast with axial as well as coronal and sagittal images.    Comparison: None.    Dosage Information: Automated exposure control was utilized.    Clinical history: Dizziness (States dizziness after laying down awhile. States last 4-5 days consistently. Has happened occasionally in the past.    Findings:  Hemorrhage: No acute intracranial hemorrhage is seen.  CSF spaces: The ventricles sulci and basal cisterns are within normal limits.  Brain  parenchyma: Unremarkable with preservation of the grey white junction throughout.  Cerebellum: Unremarkable.  Vascular: Unremarkable venous sinuses. Subtle atheromatous calcification of the intracranial arteries is seen.  Sella and skull base: The sella appears to be within normal limits for age.  Cerebellopontine angles: Within normal limits.  Herniation: None.  Intracranial calcifications: Incidental note is made of some pineal region calcification. Incidental note is made of minimal right basal ganglia calcifcation.  Calvarium: No acute linear or depressed skull fracture is seen.    Maxillofacial Structures:  Paranasal sinuses: The visualized paranasal sinuses appear clear with no significant mucoperiosteal thickening or air fluid levels identified.  Orbits: The orbits appear unremarkable.  Zygomatic arches: The zygomatic arches are intact and unremarkable.  Temporal bones and mastoids: The temporal bones and mastoids appear unremarkable.  TMJ: The mandibular condyles appear normally placed with respect to the mandibular fossa.  Nasal Bones: No displaced nasal bone fracture is seen.    Visualized upper cervical spine: The visualized cervical spine appears unremarkable.      Impression:  1. No acute intracranial process identified. Details and other findings as noted above.                                         X-Ray Chest 1 View (Preliminary result)  Result time 01/19/24 23:50:10      Wet Read by Isaiah Friedman MD (01/19/24 23:50:10, [facility], Emergency Medicine)    No acute abnormality appreciated.                                     Medications   lactated ringers bolus 1,000 mL (0 mLs Intravenous Stopped 1/20/24 0108)   meclizine tablet 25 mg (25 mg Oral Given 1/20/24 0008)     Medical Decision Making   Patient is a pleasant 58-year-old female presents emergency room with complaints feeling lightheaded.  Will obtain laboratory evaluation including CBC CMP and urinalysis.   Due to fatigue, will also obtain a COVID/flu PCR.  Will obtain CT scan of the head since patient has been having intermittent symptoms of this.  Will  obtain orthostatic blood pressures, and likely give IV fluids along with a trial of meclizine.      Differential diagnosis:  Vertigo, presyncope, dehydration, acute kidney injury, anemia, electrolyte abnormality,    Amount and/or Complexity of Data Reviewed  Labs: ordered.  Radiology: ordered and independent interpretation performed.               ED Course as of 01/20/24 0146   Sat Jan 20, 2024   0145  Patient currently feeling much improved.  Discussed with patient family regarding results.  No acute lab abnormality appreciated.  Normal cardiac workup as well.  No abnormal CT scan findings.  Patient does report feeling improved after IV fluids, as well as after receiving meclizine.  Might be combination of dehydration versus degree of peripheral vertigo, though patient did not have any horizontal nystagmus present.  Will treat with meclizine as needed for dizziness to see if this helps with symptoms, patient also encouraged to maintain hydration.  Will follow up outpatient with primary care physician.  ER precautions given for any acute worsening. [MW]      ED Course User Index  [MW] Isaiah Friedman MD                           Clinical Impression:  Final diagnoses:  [R42] Dizziness          ED Disposition Condition    Discharge Stable          ED Prescriptions       Medication Sig Dispense Start Date End Date Auth. Provider    meclizine (ANTIVERT) 12.5 mg tablet Take 1 tablet (12.5 mg total) by mouth 3 (three) times daily as needed for Dizziness. 20 tablet 1/20/2024 -- Isaiah Friedman MD          Follow-up Information       Follow up With Specialties Details Why Contact Info    Carol Joseph FNP Nurse Practitioner   [address] [facility]  Internal Medicine Clinic  [facility] [address] [phone]      [facility]  Emergency Medicine  As needed, If symptoms worsen [address] [facility] [address]  [phone]             Isaiah Friedman MD  01/20/24 0146

## 2024-06-24 ENCOUNTER — OFFICE VISIT (OUTPATIENT)
Dept: INTERNAL MEDICINE | Facility: CLINIC | Age: 59
End: 2024-06-24
Payer: MEDICAID

## 2024-06-24 VITALS
BODY MASS INDEX: 19.29 KG/M2 | WEIGHT: 102.19 LBS | SYSTOLIC BLOOD PRESSURE: 123 MMHG | DIASTOLIC BLOOD PRESSURE: 75 MMHG | HEART RATE: 65 BPM | HEIGHT: 61 IN | RESPIRATION RATE: 18 BRPM | TEMPERATURE: 98 F

## 2024-06-24 DIAGNOSIS — M54.2 CERVICALGIA: Primary | ICD-10-CM

## 2024-06-24 DIAGNOSIS — Z11.9 SCREENING EXAMINATION FOR INFECTIOUS DISEASE: ICD-10-CM

## 2024-06-24 DIAGNOSIS — Z00.00 WELLNESS EXAMINATION: ICD-10-CM

## 2024-06-24 DIAGNOSIS — Z12.11 COLON CANCER SCREENING: ICD-10-CM

## 2024-06-24 DIAGNOSIS — R00.2 PALPITATIONS: ICD-10-CM

## 2024-06-24 DIAGNOSIS — R42 DIZZINESS: ICD-10-CM

## 2024-06-24 DIAGNOSIS — Z12.31 VISIT FOR SCREENING MAMMOGRAM: ICD-10-CM

## 2024-06-24 DIAGNOSIS — M85.88 OSTEOPENIA OF LUMBAR SPINE: ICD-10-CM

## 2024-06-24 DIAGNOSIS — Z78.0 POSTMENOPAUSE: ICD-10-CM

## 2024-06-24 DIAGNOSIS — M54.12 CERVICAL RADICULOPATHY: ICD-10-CM

## 2024-06-24 PROBLEM — J30.2 SEASONAL ALLERGIES: Status: ACTIVE | Noted: 2024-06-24

## 2024-06-24 PROBLEM — Z23 NEED FOR VACCINATION: Status: RESOLVED | Noted: 2022-07-12 | Resolved: 2024-06-24

## 2024-06-24 PROCEDURE — 3074F SYST BP LT 130 MM HG: CPT | Mod: CPTII,,, | Performed by: NURSE PRACTITIONER

## 2024-06-24 PROCEDURE — 3078F DIAST BP <80 MM HG: CPT | Mod: CPTII,,, | Performed by: NURSE PRACTITIONER

## 2024-06-24 PROCEDURE — 3008F BODY MASS INDEX DOCD: CPT | Mod: CPTII,,, | Performed by: NURSE PRACTITIONER

## 2024-06-24 PROCEDURE — 99214 OFFICE O/P EST MOD 30 MIN: CPT | Mod: S$PBB,,, | Performed by: NURSE PRACTITIONER

## 2024-06-24 PROCEDURE — 99215 OFFICE O/P EST HI 40 MIN: CPT | Mod: PBBFAC | Performed by: NURSE PRACTITIONER

## 2024-06-24 PROCEDURE — 1160F RVW MEDS BY RX/DR IN RCRD: CPT | Mod: CPTII,,, | Performed by: NURSE PRACTITIONER

## 2024-06-24 PROCEDURE — 1159F MED LIST DOCD IN RCRD: CPT | Mod: CPTII,,, | Performed by: NURSE PRACTITIONER

## 2024-06-24 NOTE — ASSESSMENT & PLAN NOTE
Daily calcium and vitamin D supplementation: Calcium 1500 mg daily; high calcium foods include salmon or sardines with bones, low fat yogurt, skim milk, green vegetables, and cheese. Vitamin D 3 2000 IU once daily; high vitamin D foods include mushrooms, fish oil, cod liver, salmon, tuna, egg yolks, milk fortified with Vitamin D and foods fortified with Vitamin D such as cereals and oatmeal.  Decrease/ Avoid use of alcohol, tobacco use, caffeine.  Educated on health benefits of at least 5 days/ week of 30 minutes moderate intensity exercise (brisk walking) and 2 or more days/ week of muscle strength activities  Fall precautions discussed such as removing scatter and clutter in home, use hand rails when walking up stairs, proper foot wear, and use of bright lights in home especially at night.

## 2024-06-24 NOTE — ASSESSMENT & PLAN NOTE
Patient reports chronic cervicalgia x 30 years and previously was being treated by chiropractor with relief however unable to continue out of pocket costs. Endorses radiculopathy and numbness b/l hands and left elbow.   XR cervical spine, pt would benefit from referral to PT  Ok to continue OTC Advil for severe pain relief or OTC Tylenol

## 2024-06-24 NOTE — ASSESSMENT & PLAN NOTE
Palpitations occurring every few days and associated with dizziness.   Had negative CT head 1/2024, prior Carotid US negative 2017  Denies any further testing previously completed  Echo, Holter, Labs  EKG done 1/2024 reviewed

## 2024-06-24 NOTE — PROGRESS NOTES
Nancy L Jason, NP   [facility]  [address] [facility] [address]      PATIENT NAME: Laura Alexander  : 1965  DATE: 24  MRN: [id number]        History of Present Illness / Problem Focused Workflow     Laura Alexander presents to the clinic with Establish Care (States in morning feel weak and off balance, has blurred vision at times. Feels better after drinking orange juice)     HPI: 57 yo [place] female to establish PCP care. She is accompanied by male visitor. Former patient of STEPHANIE Joseph, last visit 22. PMH HLD, palpitations, dizziness, and osteopenia. She is c/o feeling off balance upon awakening. Drinks OJ and feels better but occurs intermittently in the day also. She thinks it is low blood sugar. Had ER visit 2024 for dizziness and was provided with script for meclizine. It seems per chart review she has had history of dizziness.  Endorses palpitations and states ongoing for years but are now occurring more frequently every few days. Does endorse dizziness with episodes. Denies CP, SOB. C/o neck and shoulder pain bilaterally with left worse, rates 8/10. Has numbness in b/l hands, right worse (numbness in elbows) x 4 years. She states she has had this for over 30 years. She was previously going to chiropractor in the last 3 years which did help but has stopped with that care due to not covered by insurance. Takes OTC Advil with severe pain with relief. Denies any fever, chills, visual changes, sore throat, dysphagia, abdominal pain, n/v/d, hematochezia, hematuria, vaginal bleeding.    Screenings:   Mammogram 22  DEXA 22    Other providers:   [facility]    Review of Systems     Review of Systems   Constitutional: Negative.    HENT:  Positive for tinnitus.    Eyes:  Positive for visual disturbance.   Respiratory: Negative.     Cardiovascular: Negative.    Gastrointestinal: Negative.    Endocrine: Negative.   "  Genitourinary: Negative.    Musculoskeletal:  Positive for neck pain.   Skin: Negative.    Allergic/Immunologic: Negative.    Neurological:  Positive for dizziness, numbness and headaches.   Hematological: Negative.    Psychiatric/Behavioral: Negative.         Medical / Social / Family History     -------------------------------------    HLD (hyperlipidemia)    Need for vaccination        Past Surgical History:   Procedure Laterality Date    appendectomy      BILATERAL TUBAL LIGATION      HYSTERECTOMY         Social History     Socioeconomic History    Marital status:     Number of children: 0   Tobacco Use    Smoking status: Never    Smokeless tobacco: Never   Substance and Sexual Activity    Alcohol use: Never    Drug use: Never     Social Determinants of Health     Transportation Needs: No Transportation Needs (7/12/2022)    PRAPARE - Transportation     Lack of Transportation (Medical): No     Lack of Transportation (Non-Medical): No   Housing Stability: Low Risk  (7/12/2022)    Housing Stability Vital Sign     Unable to Pay for Housing in the Last Year: No     Number of Places Lived in the Last Year: 1     Unstable Housing in the Last Year: No        Family History   Problem Relation Name Age of Onset    Hypertension Mother      Breast cancer Paternal Aunt          Medications and Allergies     Medications  Current Outpatient Medications   Medication Instructions    calcium-vitamin D 250 mg-2.5 mcg (100 unit) per tablet 1 tablet, Oral, Daily    omega-3 fatty acids/fish oil (FISH OIL-OMEGA-3 FATTY ACIDS) 300-1,000 mg capsule Oral, Daily    vitamin D (VITAMIN D3) 1,000 Units, Oral, Daily       Allergies  Review of patient's allergies indicates:  No Known Allergies    Physical Examination     Visit Vitals  /75 (BP Location: Left arm, Patient Position: Sitting, BP Method: Small (Automatic))   Pulse 65   Temp 98.1 °F (36.7 °C) (Oral)   Resp 18   Ht 5' 0.98" (1.549 m)   Wt 46.4 kg (102 lb 3.2 oz)   BMI " 19.32 kg/m²       Physical Exam  Vitals and nursing note reviewed.   Constitutional:       General: She is not in acute distress.     Appearance: Normal appearance. She is not ill-appearing, toxic-appearing or diaphoretic.   HENT:      Head: Normocephalic.      Right Ear: Tympanic membrane, ear canal and external ear normal.      Left Ear: Tympanic membrane, ear canal and external ear normal.      Nose: Nose normal. No congestion.      Mouth/Throat:      Mouth: Mucous membranes are moist.   Eyes:      Extraocular Movements: Extraocular movements intact.      Conjunctiva/sclera: Conjunctivae normal.      Pupils: Pupils are equal, round, and reactive to light.   Neck:      Thyroid: No thyroid mass, thyromegaly or thyroid tenderness.      Vascular: No carotid bruit.   Cardiovascular:      Rate and Rhythm: Normal rate and regular rhythm.      Pulses: Normal pulses.      Heart sounds: No murmur heard.  Pulmonary:      Effort: Pulmonary effort is normal. No respiratory distress.      Breath sounds: Normal breath sounds.   Abdominal:      General: Bowel sounds are normal. There is no distension.      Palpations: Abdomen is soft. There is no mass.      Tenderness: There is no abdominal tenderness. There is no guarding or rebound.      Hernia: No hernia is present.   Musculoskeletal:         General: Normal range of motion.      Cervical back: Neck supple. Tenderness present. No spasms. No pain with movement.      Right lower leg: No edema.      Left lower leg: No edema.   Lymphadenopathy:      Cervical: No cervical adenopathy.   Skin:     General: Skin is warm and dry.      Capillary Refill: Capillary refill takes less than 2 seconds.   Neurological:      Mental Status: She is alert and oriented to person, place, and time. Mental status is at baseline.      Motor: No weakness.      Coordination: Coordination normal.      Gait: Gait normal.   Psychiatric:         Mood and Affect: Mood normal.         Behavior: Behavior  "normal.         Thought Content: Thought content normal.         Judgment: Judgment normal.           Results     Lab Results   Component Value Date    WBC 7.83 01/19/2024    RBC 4.65 01/19/2024    HGB 13.4 01/19/2024    HCT 40.8 01/19/2024    MCV 87.7 01/19/2024    MCH 28.8 01/19/2024    MCHC 32.8 (L) 01/19/2024    RDW 12.4 01/19/2024     01/19/2024    MPV 8.2 01/19/2024     Sodium   Date Value Ref Range Status   01/19/2024 140 136 - 145 mmol/L Final     Potassium   Date Value Ref Range Status   01/19/2024 3.5 3.5 - 5.1 mmol/L Final     Chloride   Date Value Ref Range Status   01/19/2024 106 98 - 107 mmol/L Final     CO2   Date Value Ref Range Status   01/19/2024 25 22 - 29 mmol/L Final     Blood Urea Nitrogen   Date Value Ref Range Status   01/19/2024 12.0 9.8 - 20.1 mg/dL Final     Creatinine   Date Value Ref Range Status   01/19/2024 0.76 0.55 - 1.02 mg/dL Final     Calcium   Date Value Ref Range Status   01/19/2024 9.3 8.4 - 10.2 mg/dL Final     Albumin   Date Value Ref Range Status   01/19/2024 4.3 3.5 - 5.0 g/dL Final     Bilirubin Total   Date Value Ref Range Status   01/19/2024 0.7 <=1.5 mg/dL Final     ALP   Date Value Ref Range Status   01/19/2024 65 40 - 150 unit/L Final     AST   Date Value Ref Range Status   01/19/2024 19 5 - 34 unit/L Final     ALT   Date Value Ref Range Status   01/19/2024 14 0 - 55 unit/L Final     Estimated GFR-Non    Date Value Ref Range Status   10/13/2021 97 >>=90 mL/min/1.73 m2 Final     Lab Results   Component Value Date    CHOL 229 (H) 07/11/2022     Lab Results   Component Value Date    HDL 72 (H) 07/11/2022     No results found for: "LDLCALC"  Lab Results   Component Value Date    TRIG 96 07/11/2022     No results found for: "CHOLHDL"  Lab Results   Component Value Date    TSH 0.9641 10/13/2021     Lab Results   Component Value Date    PHUR 6.0 01/19/2024    PROTEINUA Negative 01/19/2024    GLUCUA Normal 01/19/2024    KETONESU Negative 10/13/2021    " "OCCULTUA Negative 01/19/2024    NITRITE Negative 01/19/2024    LEUKOCYTESUR Negative 01/19/2024     Lab Results   Component Value Date    HGBA1C 5.5 10/13/2021    HGBA1C 5.7 01/08/2020    HGBA1C 5.7 10/10/2018     No results found for: "MICROALBUR", "[id number]"   No results found for this or any previous visit.         Assessment       ICD-10-CM ICD-9-CM   1. Cervicalgia  M54.2 723.1   2. Cervical radiculopathy  M54.12 723.4   3. Osteopenia of lumbar spine  M85.88 733.90   4. Visit for screening mammogram  Z12.31 V76.12   5. Wellness examination  Z00.00 V70.0   6. Postmenopause  Z78.0 V49.81   7. Screening examination for infectious disease  Z11.9 V75.9   8. Colon cancer screening  Z12.11 V76.51   9. Palpitations  R00.2 785.1   10. Dizziness  R42 780.4       Plan       Problem List Items Addressed This Visit          Neuro    Cervical radiculopathy    Current Assessment & Plan     Patient reports chronic cervicalgia x 30 years and previously was being treated by chiropractor with relief however unable to continue out of pocket costs. Endorses radiculopathy and numbness b/l hands and left elbow.   XR cervical spine, pt would benefit from referral to PT  Ok to continue OTC Advil for severe pain relief or OTC Tylenol         Relevant Orders    X-Ray Cervical Spine 2 or 3 Views       Cardiac/Vascular    Palpitations    Current Assessment & Plan     Palpitations occurring every few days and associated with dizziness.   Had negative CT head 1/2024, prior Carotid US negative 2017  Denies any further testing previously completed  Echo, Holter, Labs  EKG done 1/2024 reviewed          Relevant Orders    Echo    Holter monitor - 48 hour       Orthopedic    Cervicalgia - Primary    Current Assessment & Plan     Patient reports chronic cervicalgia x 30 years and previously was being treated by chiropractor with relief however unable to continue out of pocket costs. Endorses radiculopathy and numbness b/l hands and left elbow. "   XR cervical spine, pt would benefit from referral to PT  Ok to continue OTC Advil for severe pain relief or OTC Tylenol           Relevant Orders    X-Ray Cervical Spine 2 or 3 Views    Osteopenia    Overview     DEXA 8/2022         Current Assessment & Plan       Daily calcium and vitamin D supplementation: Calcium 1500 mg daily; high calcium foods include salmon or sardines with bones, low fat yogurt, skim milk, green vegetables, and cheese. Vitamin D 3 2000 IU once daily; high vitamin D foods include mushrooms, fish oil, cod liver, salmon, tuna, egg yolks, milk fortified with Vitamin D and foods fortified with Vitamin D such as cereals and oatmeal.  Decrease/ Avoid use of alcohol, tobacco use, caffeine.  Educated on health benefits of at least 5 days/ week of 30 minutes moderate intensity exercise (brisk walking) and 2 or more days/ week of muscle strength activities  Fall precautions discussed such as removing scatter and clutter in home, use hand rails when walking up stairs, proper foot wear, and use of bright lights in home especially at night.              Relevant Orders    DXA Bone Density Appendicular Skeleton    Vitamin D       Other    Dizziness    Current Assessment & Plan     See palpitations  Labs ordered         Relevant Orders    Echo    Holter monitor - 48 hour     Other Visit Diagnoses       Visit for screening mammogram        Relevant Orders    Mammo Digital Screening Bilat w/ [place]    Wellness examination        Relevant Orders    Hemoglobin A1C    Lipid Panel    Microalbumin/Creatinine Ratio, Urine    TSH    Urinalysis    Hepatitis C Antibody    Hepatitis B Surface Antigen    HIV 1/2 Ag/Ab (4th Gen)    Postmenopause        Relevant Orders    DXA Bone Density Appendicular Skeleton    Screening examination for infectious disease        Relevant Orders    Hepatitis C Antibody    Hepatitis B Surface Antigen    HIV 1/2 Ag/Ab (4th Gen)    Colon cancer screening        Relevant Orders    Ambulatory  referral/consult to Endo Procedure             Future Appointments   Date Time Provider Department Center   9/9/2024  7:40 AM Nancy Gomez NP [facility]        Follow up in about 4 weeks (around 7/22/2024) for wellness with fasting labs before visit.    Signature:  Nancy Gomez NP  [facility]  [address] [facility] [address]    Date of encounter: 6/24/24

## 2024-08-20 ENCOUNTER — HOSPITAL ENCOUNTER (OUTPATIENT)
Dept: RADIOLOGY | Facility: HOSPITAL | Age: 59
Discharge: HOME | End: 2024-08-20
Attending: NURSE PRACTITIONER
Payer: MEDICAID

## 2024-08-20 ENCOUNTER — HOSPITAL ENCOUNTER (OUTPATIENT)
Dept: CARDIOLOGY | Facility: HOSPITAL | Age: 59
Discharge: HOME | End: 2024-08-20
Attending: NURSE PRACTITIONER
Payer: MEDICAID

## 2024-08-20 VITALS
WEIGHT: 102 LBS | HEIGHT: 60 IN | BODY MASS INDEX: 20.03 KG/M2 | SYSTOLIC BLOOD PRESSURE: 116 MMHG | DIASTOLIC BLOOD PRESSURE: 72 MMHG

## 2024-08-20 DIAGNOSIS — R42 DIZZINESS: ICD-10-CM

## 2024-08-20 DIAGNOSIS — R00.2 PALPITATIONS: ICD-10-CM

## 2024-08-20 DIAGNOSIS — Z12.31 VISIT FOR SCREENING MAMMOGRAM: ICD-10-CM

## 2024-08-20 LAB
APICAL FOUR CHAMBER EJECTION FRACTION: 60 %
APICAL TWO CHAMBER EJECTION FRACTION: 60 %
AV INDEX (PROSTH): 1.09
AV MEAN GRADIENT: 2 MMHG
AV PEAK GRADIENT: 3 MMHG
AV VALVE AREA BY VELOCITY RATIO: 2.54 CM²
AV VALVE AREA: 2.83 CM²
AV VELOCITY RATIO: 0.98
BSA FOR ECHO PROCEDURE: 1.4 M2
CV ECHO LV RWT: 0.39 CM
DOP CALC AO PEAK VEL: 0.85 M/S
DOP CALC AO VTI: 16.8 CM
DOP CALC LVOT AREA: 2.6 CM2
DOP CALC LVOT DIAMETER: 1.82 CM
DOP CALC LVOT PEAK VEL: 0.83 M/S
DOP CALC LVOT STROKE VOLUME: 47.58 CM3
DOP CALC MV VTI: 23 CM
DOP CALCLVOT PEAK VEL VTI: 18.3 CM
E WAVE DECELERATION TIME: 206.6 MSEC
E/A RATIO: 2.31
E/E' RATIO: 7.9 M/S
ECHO LV POSTERIOR WALL: 0.76 CM (ref 0.6–1.1)
FRACTIONAL SHORTENING: 39 % (ref 28–44)
HR MV ECHO: 68 BPM
INTERVENTRICULAR SEPTUM: 0.7 CM (ref 0.6–1.1)
LEFT ATRIUM SIZE: 2.95 CM
LEFT INTERNAL DIMENSION IN SYSTOLE: 2.35 CM (ref 2.1–4)
LEFT VENTRICLE DIASTOLIC VOLUME INDEX: 45.54 ML/M2
LEFT VENTRICLE DIASTOLIC VOLUME: 63.76 ML
LEFT VENTRICLE END DIASTOLIC VOLUME APICAL 2 CHAMBER: 36.85 ML
LEFT VENTRICLE END DIASTOLIC VOLUME APICAL 4 CHAMBER: 44.11 ML
LEFT VENTRICLE MASS INDEX: 55 G/M2
LEFT VENTRICLE SYSTOLIC VOLUME INDEX: 13.7 ML/M2
LEFT VENTRICLE SYSTOLIC VOLUME: 19.21 ML
LEFT VENTRICULAR INTERNAL DIMENSION IN DIASTOLE: 3.85 CM (ref 3.5–6)
LEFT VENTRICULAR MASS: 77.69 G
LV LATERAL E/E' RATIO: 6.38 M/S
LV SEPTAL E/E' RATIO: 10.38 M/S
LVED V (TEICH): 63.76 ML
LVES V (TEICH): 19.21 ML
LVOT MG: 1.24 MMHG
LVOT MV: 0.5 CM/S
MV MEAN GRADIENT: 1 MMHG
MV PEAK A VEL: 0.36 M/S
MV PEAK E VEL: 0.83 M/S
MV PEAK GRADIENT: 3 MMHG
MV STENOSIS PRESSURE HALF TIME: 59.91 MS
MV VALVE AREA BY CONTINUITY EQUATION: 2.07 CM2
MV VALVE AREA P 1/2 METHOD: 3.67 CM2
OHS CV RV/LV RATIO: 0.65 CM
OHS LV EJECTION FRACTION SIMPSONS BIPLANE MOD: 60 %
PISA TR MAX VEL: 2.14 M/S
RA PRESSURE ESTIMATED: 3 MMHG
RIGHT VENTRICULAR END-DIASTOLIC DIMENSION: 2.49 CM
RV TB RVSP: 5 MMHG
TDI LATERAL: 0.13 M/S
TDI SEPTAL: 0.08 M/S
TDI: 0.11 M/S
TR MAX PG: 18 MMHG
TRICUSPID ANNULAR PLANE SYSTOLIC EXCURSION: 2.08 CM
TV REST PULMONARY ARTERY PRESSURE: 21 MMHG
Z-SCORE OF LEFT VENTRICULAR DIMENSION IN END DIASTOLE: -1.21
Z-SCORE OF LEFT VENTRICULAR DIMENSION IN END SYSTOLE: -1.09

## 2024-08-20 PROCEDURE — 77063 BREAST TOMOSYNTHESIS BI: CPT | Mod: TC

## 2024-08-20 PROCEDURE — 77063 BREAST TOMOSYNTHESIS BI: CPT | Mod: 26,,, | Performed by: STUDENT IN AN ORGANIZED HEALTH CARE EDUCATION/TRAINING PROGRAM

## 2024-08-20 PROCEDURE — 93306 TTE W/DOPPLER COMPLETE: CPT

## 2024-08-20 PROCEDURE — 77067 SCR MAMMO BI INCL CAD: CPT | Mod: 26,,, | Performed by: STUDENT IN AN ORGANIZED HEALTH CARE EDUCATION/TRAINING PROGRAM

## 2024-08-20 PROCEDURE — 77067 SCR MAMMO BI INCL CAD: CPT | Mod: TC

## 2024-08-20 PROCEDURE — 93226 XTRNL ECG REC<48 HR SCAN A/R: CPT

## 2024-08-23 LAB
OHS CV EVENT MONITOR DAY: 0
OHS CV HOLTER LENGTH DECIMAL HOURS: 48
OHS CV HOLTER LENGTH HOURS: 48
OHS CV HOLTER LENGTH MINUTES: 0
OHS CV HOLTER SINUS AVERAGE HR: 79
OHS CV HOLTER SINUS MAX HR: 105
OHS CV HOLTER SINUS MIN HR: 54

## 2024-09-03 ENCOUNTER — HOSPITAL ENCOUNTER (OUTPATIENT)
Dept: RADIOLOGY | Facility: HOSPITAL | Age: 59
Discharge: HOME | End: 2024-09-03
Attending: NURSE PRACTITIONER
Payer: MEDICAID

## 2024-09-03 DIAGNOSIS — Z78.0 POSTMENOPAUSE: ICD-10-CM

## 2024-09-03 DIAGNOSIS — M85.88 OSTEOPENIA OF LUMBAR SPINE: ICD-10-CM

## 2024-09-03 PROCEDURE — 77081 DXA BONE DENSITY APPENDICULR: CPT | Mod: TC

## 2024-09-09 ENCOUNTER — HOSPITAL ENCOUNTER (OUTPATIENT)
Dept: RADIOLOGY | Facility: HOSPITAL | Age: 59
Discharge: HOME | End: 2024-09-09
Attending: NURSE PRACTITIONER
Payer: MEDICAID

## 2024-09-09 ENCOUNTER — OFFICE VISIT (OUTPATIENT)
Dept: INTERNAL MEDICINE | Facility: CLINIC | Age: 59
End: 2024-09-09
Payer: MEDICAID

## 2024-09-09 VITALS
RESPIRATION RATE: 18 BRPM | HEIGHT: 60 IN | SYSTOLIC BLOOD PRESSURE: 122 MMHG | OXYGEN SATURATION: 100 % | DIASTOLIC BLOOD PRESSURE: 80 MMHG | WEIGHT: 101.31 LBS | TEMPERATURE: 98 F | HEART RATE: 56 BPM | BODY MASS INDEX: 19.89 KG/M2

## 2024-09-09 DIAGNOSIS — M54.2 CERVICALGIA: ICD-10-CM

## 2024-09-09 DIAGNOSIS — Z12.11 COLON CANCER SCREENING: ICD-10-CM

## 2024-09-09 DIAGNOSIS — M81.0 AGE-RELATED OSTEOPOROSIS WITHOUT CURRENT PATHOLOGICAL FRACTURE: ICD-10-CM

## 2024-09-09 DIAGNOSIS — R00.1 BRADYCARDIA: ICD-10-CM

## 2024-09-09 DIAGNOSIS — R00.2 PALPITATIONS: ICD-10-CM

## 2024-09-09 DIAGNOSIS — M54.12 CERVICAL RADICULOPATHY: ICD-10-CM

## 2024-09-09 DIAGNOSIS — R53.83 OTHER FATIGUE: ICD-10-CM

## 2024-09-09 DIAGNOSIS — E16.2 HYPOGLYCEMIA: Primary | ICD-10-CM

## 2024-09-09 DIAGNOSIS — I51.7 ATRIAL ENLARGEMENT, RIGHT: ICD-10-CM

## 2024-09-09 PROCEDURE — 1160F RVW MEDS BY RX/DR IN RCRD: CPT | Mod: CPTII,,, | Performed by: NURSE PRACTITIONER

## 2024-09-09 PROCEDURE — 3044F HG A1C LEVEL LT 7.0%: CPT | Mod: CPTII,,, | Performed by: NURSE PRACTITIONER

## 2024-09-09 PROCEDURE — 3008F BODY MASS INDEX DOCD: CPT | Mod: CPTII,,, | Performed by: NURSE PRACTITIONER

## 2024-09-09 PROCEDURE — 3074F SYST BP LT 130 MM HG: CPT | Mod: CPTII,,, | Performed by: NURSE PRACTITIONER

## 2024-09-09 PROCEDURE — 72040 X-RAY EXAM NECK SPINE 2-3 VW: CPT | Mod: TC

## 2024-09-09 PROCEDURE — 3079F DIAST BP 80-89 MM HG: CPT | Mod: CPTII,,, | Performed by: NURSE PRACTITIONER

## 2024-09-09 PROCEDURE — 99215 OFFICE O/P EST HI 40 MIN: CPT | Mod: PBBFAC,25 | Performed by: NURSE PRACTITIONER

## 2024-09-09 PROCEDURE — 1159F MED LIST DOCD IN RCRD: CPT | Mod: CPTII,,, | Performed by: NURSE PRACTITIONER

## 2024-09-09 PROCEDURE — 99214 OFFICE O/P EST MOD 30 MIN: CPT | Mod: S$PBB,,, | Performed by: NURSE PRACTITIONER

## 2024-09-09 RX ORDER — ALENDRONATE SODIUM 70 MG/1
70 TABLET ORAL
Qty: 4 TABLET | Refills: 11 | Status: SHIPPED | OUTPATIENT
Start: 2024-09-09 | End: 2025-09-09

## 2024-09-09 RX ORDER — INSULIN PUMP SYRINGE, 3 ML
EACH MISCELLANEOUS
Qty: 1 EACH | Refills: 0 | Status: SHIPPED | OUTPATIENT
Start: 2024-09-09

## 2024-09-09 RX ORDER — LANCETS
EACH MISCELLANEOUS
Qty: 100 EACH | Refills: 0 | Status: SHIPPED | OUTPATIENT
Start: 2024-09-09

## 2024-09-09 NOTE — PROGRESS NOTES
Nancy L Jason, NP   [facility]  [address] [facility] [address]      PATIENT NAME: Laura Alexander  : 1965  DATE: 24  MRN: [id number]        History of Present Illness / Problem Focused Workflow     Laura Alexander presents to the clinic with Results (1. C/O having tremors in morning and evening 2.Fatigue getting worst in 2-3 months) and Fatigue  Initial visit 24:   59 yo Ethiopian female to establish PCP care. She is accompanied by male visitor. Former patient of STEPHANIE Joseph, last visit 22. PMH HLD, palpitations, dizziness, and osteopenia. She is c/o feeling off balance upon awakening. Drinks OJ and feels better but occurs intermittently in the day also. She thinks it is low blood sugar. Had ER visit 2024 for dizziness and was provided with script for meclizine. It seems per chart review she has had history of dizziness.  Endorses palpitations and states ongoing for years but are now occurring more frequently every few days. Does endorse dizziness with episodes. Denies CP, SOB. C/o neck and shoulder pain bilaterally with left worse, rates 8/10. Has numbness in b/l hands, right worse (numbness in elbows) x 4 years. She states she has had this for over 30 years. She was previously going to chiropractor in the last 3 years which did help but has stopped with that care due to not covered by insurance. Takes OTC Advil with severe pain with relief. Denies any fever, chills, visual changes, sore throat, dysphagia, abdominal pain, n/v/d, hematochezia, hematuria, vaginal bleeding.     Today 24: 60 yo Ethiopian for follow up for results and accompanied by male. She states weakness and dizziness still occurring upon awakening in the morning but overall states is getting better. After eats something sweet like juice, sweet stuff and candy, she feels better. She has fatigue and relates to chronic neck pain and b/l shoulder  tightness. Has chronic b/l hand numbness ongoing for years. Did not complete neck XR. Reviewed test results as below including mmg. Reviewed labs. A1c 5.5%. No other concerns.       Echo 6/24/24  Summary  Show Result Comparison     Left Ventricle: The left ventricle is normal in size. Normal wall thickness. There is normal systolic function. Biplane (2D) method of discs ejection fraction is 60%.    Right Ventricle: Normal right ventricular cavity size. Systolic function is normal.    Left Atrium: Left atrium is mildly dilated.    Right Atrium: Right atrium is dilated.    Aortic Valve: The aortic valve is a trileaflet valve. There is no stenosis. Aortic valve peak velocity is 0.85 m/s. Mean gradient is 2 mmHg.    Mitral Valve: The mitral valve is structurally normal. The mean pressure gradient across the mitral valve is 1 mmHg at a heart rate of 68 bpm. There is trace regurgitation.    Tricuspid Valve: The tricuspid valve is structurally normal. There is mild regurgitation.    Pulmonary Artery: The estimated pulmonary artery systolic pressure is 21 mmHg.    IVC/SVC: Normal venous pressure at 3 mmHg.    Holter 6/24/24    Interpretation Summary  Show Result Comparison     The predominant rhythm is sinus.     Please note, the physician interpreting the study is unable to modify the computer generated report (which may be inaccurate due to artifacts).       PHYSICIAN INTERPRETATION   Underlying rhythm:   Sinus  Pauses:  No significant pause noted    Symptoms:  During symptoms the patient is in sinus rhythm with a normal heart rate   Arrhythmia:  No significant arrhythmia noted     DEXA 9/2024  Impression:     The measured bone mineral density in the bilateral femoral necks corresponds with the osteoporotic category according to world health organization criteria.         Review of Systems     Review of Systems   Constitutional:  Positive for fatigue.   HENT: Negative.     Eyes: Negative.    Respiratory: Negative.      Cardiovascular: Negative.    Gastrointestinal: Negative.    Endocrine: Negative.    Genitourinary: Negative.    Musculoskeletal:  Positive for neck pain.   Skin: Negative.    Allergic/Immunologic: Negative.    Neurological:  Positive for weakness and numbness.   Hematological: Negative.    Psychiatric/Behavioral: Negative.         Medical / Social / Family History     -------------------------------------    HLD (hyperlipidemia)    Need for vaccination        Past Surgical History:   Procedure Laterality Date    appendectomy      BILATERAL TUBAL LIGATION      HYSTERECTOMY         Social History     Socioeconomic History    Marital status:     Number of children: 0   Tobacco Use    Smoking status: Never    Smokeless tobacco: Never   Substance and Sexual Activity    Alcohol use: Never    Drug use: Never    Sexual activity: Yes     Social Determinants of Health     Transportation Needs: No Transportation Needs (7/12/2022)    PRAPARE - Transportation     Lack of Transportation (Medical): No     Lack of Transportation (Non-Medical): No   Housing Stability: Low Risk  (7/12/2022)    Housing Stability Vital Sign     Unable to Pay for Housing in the Last Year: No     Number of Places Lived in the Last Year: 1     Unstable Housing in the Last Year: No        Family History   Problem Relation Name Age of Onset    Hypertension Mother      Breast cancer Paternal Aunt          Medications and Allergies     Medications  Current Outpatient Medications   Medication Instructions    alendronate (FOSAMAX) 70 mg, Oral, Every 7 days    blood sugar diagnostic Strp To check BG one times daily, to use with insurance preferred meter    blood-glucose meter kit To check BG one times daily, to use with insurance preferred meter    calcium-vitamin D 250 mg-2.5 mcg (100 unit) per tablet 1 tablet, Oral, Daily    lancets Misc To check BG one times daily, to use with insurance preferred meter    omega-3 fatty acids/fish oil (FISH  OIL-OMEGA-3 FATTY ACIDS) 300-1,000 mg capsule Oral, Daily    vitamin D (VITAMIN D3) 1,000 Units, Oral, Daily       Allergies  Review of patient's allergies indicates:  No Known Allergies    Physical Examination     Visit Vitals  /80 (BP Location: Left arm, Patient Position: Sitting, BP Method: Medium (Automatic))   Pulse (!) 56   Temp 97.9 °F (36.6 °C) (Oral)   Resp 18   Ht 5' (1.524 m)   Wt 45.9 kg (101 lb 4.8 oz)   SpO2 100%   BMI 19.78 kg/m²       Physical Exam  Constitutional:       General: She is not in acute distress.     Appearance: Normal appearance. She is not ill-appearing or diaphoretic.   HENT:      Head: Normocephalic.   Neck:      Thyroid: No thyroid mass, thyromegaly or thyroid tenderness.      Vascular: No carotid bruit.   Cardiovascular:      Rate and Rhythm: Normal rate and regular rhythm.      Heart sounds: No murmur heard.  Pulmonary:      Effort: Pulmonary effort is normal. No respiratory distress.      Breath sounds: Normal breath sounds. No stridor. No wheezing, rhonchi or rales.   Musculoskeletal:         General: Tenderness present.      Cervical back: Tenderness present.   Skin:     General: Skin is warm and dry.   Neurological:      Mental Status: She is alert and oriented to person, place, and time. Mental status is at baseline.      Coordination: Coordination normal.      Gait: Gait normal.   Psychiatric:         Mood and Affect: Mood normal.         Behavior: Behavior normal.         Thought Content: Thought content normal.         Judgment: Judgment normal.           Results     Lab Results   Component Value Date    WBC 7.83 01/19/2024    RBC 4.65 01/19/2024    HGB 13.4 01/19/2024    HCT 40.8 01/19/2024    MCV 87.7 01/19/2024    MCH 28.8 01/19/2024    MCHC 32.8 (L) 01/19/2024    RDW 12.4 01/19/2024     01/19/2024    MPV 8.2 01/19/2024     Sodium   Date Value Ref Range Status   01/19/2024 140 136 - 145 mmol/L Final     Potassium   Date Value Ref Range Status   01/19/2024  3.5 3.5 - 5.1 mmol/L Final     Chloride   Date Value Ref Range Status   01/19/2024 106 98 - 107 mmol/L Final     CO2   Date Value Ref Range Status   01/19/2024 25 22 - 29 mmol/L Final     Blood Urea Nitrogen   Date Value Ref Range Status   01/19/2024 12.0 9.8 - 20.1 mg/dL Final     Creatinine   Date Value Ref Range Status   01/19/2024 0.76 0.55 - 1.02 mg/dL Final     Calcium   Date Value Ref Range Status   01/19/2024 9.3 8.4 - 10.2 mg/dL Final     Albumin   Date Value Ref Range Status   01/19/2024 4.3 3.5 - 5.0 g/dL Final     Bilirubin Total   Date Value Ref Range Status   01/19/2024 0.7 <=1.5 mg/dL Final     ALP   Date Value Ref Range Status   01/19/2024 65 40 - 150 unit/L Final     AST   Date Value Ref Range Status   01/19/2024 19 5 - 34 unit/L Final     ALT   Date Value Ref Range Status   01/19/2024 14 0 - 55 unit/L Final     Estimated GFR-Non    Date Value Ref Range Status   10/13/2021 97 >>=90 mL/min/1.73 m2 Final     Lab Results   Component Value Date    CHOL 215 (H) 09/03/2024     Lab Results   Component Value Date    HDL 74 (H) 09/03/2024     Lab Results   Component Value Date    TRIG 126 09/03/2024     Lab Results   Component Value Date    .00 09/03/2024     Lab Results   Component Value Date    TSH 1.336 09/03/2024     Lab Results   Component Value Date    PHUR 6.0 09/03/2024    PROTEINUA Negative 09/03/2024    GLUCUA Normal 09/03/2024    KETONESU Negative 10/13/2021    OCCULTUA Negative 09/03/2024    NITRITE Negative 09/03/2024    LEUKOCYTESUR Negative 09/03/2024     Lab Results   Component Value Date    HGBA1C 5.5 09/03/2024    HGBA1C 5.5 10/13/2021    HGBA1C 5.7 01/08/2020     Lab Results   Component Value Date    MICALBCREAT  09/03/2024      Comment:      UNABLE TO CALC        Assessment       ICD-10-CM ICD-9-CM   1. Hypoglycemia  E16.2 251.2   2. Other fatigue  R53.83 780.79   3. Age-related osteoporosis without current pathological fracture  M81.0 733.01   4. Bradycardia  R00.1  427.89   5. Palpitations  R00.2 785.1   6. Colon cancer screening  Z12.11 V76.51   7. Cervical radiculopathy  M54.12 723.4   8. Atrial enlargement, right  I51.7 429.3   9. Cervicalgia  M54.2 723.1       Plan       Problem List Items Addressed This Visit          Neuro    Cervical radiculopathy    Current Assessment & Plan     Stable symptoms though attributes some of her fatigue to chronic cervicalgia. Encouraged her to complete XR cervical spine and referral for PT to be recommended once complete   See previous note: Patient reports chronic cervicalgia x 30 years and previously was being treated by chiropractor with relief however unable to continue out of pocket costs. Endorses radiculopathy and numbness b/l hands and left elbow.   XR cervical spine, pt would benefit from referral to PT  Ok to continue OTC Advil for severe pain relief or OTC Tylenol            Cardiac/Vascular    Atrial enlargement, right    Current Assessment & Plan     Echo 6/24/24  Summary  Show Result Comparison     Left Ventricle: The left ventricle is normal in size. Normal wall thickness. There is normal systolic function. Biplane (2D) method of discs ejection fraction is 60%.    Right Ventricle: Normal right ventricular cavity size. Systolic function is normal.    Left Atrium: Left atrium is mildly dilated.    Right Atrium: Right atrium is dilated.    Aortic Valve: The aortic valve is a trileaflet valve. There is no stenosis. Aortic valve peak velocity is 0.85 m/s. Mean gradient is 2 mmHg.    Mitral Valve: The mitral valve is structurally normal. The mean pressure gradient across the mitral valve is 1 mmHg at a heart rate of 68 bpm. There is trace regurgitation.    Tricuspid Valve: The tricuspid valve is structurally normal. There is mild regurgitation.    Pulmonary Artery: The estimated pulmonary artery systolic pressure is 21 mmHg.    IVC/SVC: Normal venous pressure at 3 mmHg.    Referral to cardiology for further eval and continue  management         Relevant Orders    Ambulatory referral/consult to Cardiology    Bradycardia    Current Assessment & Plan     See echo and Holter monitor results.  Patient is not on beta-blocker.  Does endorse chronic fatigue and weakness.  Referral to Cardiology         Relevant Orders    Ambulatory referral/consult to Cardiology    Palpitations    Current Assessment & Plan     At last visit she reported:  Palpitations occurring every few days and associated with dizziness.   Had negative CT head 1/2024, prior Carotid US negative 2017  Denies any further testing previously completed  Echo, Holter, Labs  EKG done 1/2024 reviewed     Today inform patient of results of echo and Holter monitor.  Referral to cardiology for further eval            Endocrine    Age-related osteoporosis without current pathological fracture    Overview     >>OVERVIEW FOR OSTEOPENIA WRITTEN ON 6/24/2024  9:33 AM BY BRE ARMSTRONG NP    DEXA 8/2022         Current Assessment & Plan     DEXA 9/2024  Impression     The measured bone mineral density in the bilateral femoral necks corresponds with the osteoporotic category according to world health organization criteria.    Daily calcium and vitamin D supplementation: Calcium 1500 mg daily; high calcium foods include salmon or sardines with bones, low fat yogurt, skim milk, green vegetables, and cheese. Vitamin D 3 2000 IU once daily; high vitamin D foods include mushrooms, fish oil, cod liver, salmon, tuna, egg yolks, milk fortified with Vitamin D and foods fortified with Vitamin D such as cereals and oatmeal.  Decrease/ Avoid use of alcohol, tobacco use, caffeine.  Educated on health benefits of at least 5 days/ week of 30 minutes moderate intensity exercise (brisk walking) and 2 or more days/ week of muscle strength activities  Fall precautions discussed such as removing scatter and clutter in home, use hand rails when walking up stairs, proper foot wear, and use of bright lights in home  especially at night.   Reviewed proper administration of rx: take with 6-8 ounces of water 0.5 hour breakfast or any medication and remain upright after taking medication.           Relevant Medications    alendronate (FOSAMAX) 70 MG tablet    Hypoglycemia - Primary    Current Assessment & Plan     Lab Results   Component Value Date    HGBA1C 5.5 09/03/2024     Patient with symptoms suspicious for hypoglycemia episodes-- see HPI for details  Discussed with patient importance of monitoring blood sugar and adequate frequent meals to avoid long periods of time without eating to maintain a normal blood sugar   Referral to nutrition  Prescription for glucometer and supplies  Patient may benefit from referral to endocrinology however wait time is extended at this time.  Patient to notify provider for any continued or worsening symptoms for sooner appointment         Relevant Medications    blood-glucose meter kit    lancets Misc    blood sugar diagnostic Strp    Other Relevant Orders    Ambulatory referral/consult to Nutrition Services    CBC Auto Differential    Comprehensive Metabolic Panel       Orthopedic    Cervicalgia    Current Assessment & Plan     See cervical radiculopathy            Other    Other fatigue    Current Assessment & Plan     Patient with continued chronic fatigue without known etiology.  Echo overall without significant findings.  Holter monitor results were within normal limits.  She previously had carotid ultrasound and CT head.  Lab results reviewed.  Patient symptoms with history with potential episodes of hypoglycemia occurring.  See hypoglycemia.  Referral to Cardiology for further eval of continued fatigue with regard to echo and Holter monitor results as well as in the setting of bradycardia         Relevant Orders    Ambulatory referral/consult to Cardiology     Other Visit Diagnoses       Colon cancer screening        Relevant Orders    Cologuard Screening (Multitarget Stool DNA)             Future Appointments   Date Time Provider Department Center   12/9/2024  7:40 AM Nancy Gomez NP [facility] [place]        Follow up in about 3 months (around 12/9/2024) for hypoglycemia and neck f/u, with fasting labs before visit.    Signature:  Nancy Gomez NP  [facility]  [address] [facility] [address]    Date of encounter: 9/9/24

## 2024-09-10 ENCOUNTER — TELEPHONE (OUTPATIENT)
Dept: INTERNAL MEDICINE | Facility: CLINIC | Age: 59
End: 2024-09-10
Payer: MEDICAID

## 2024-09-10 DIAGNOSIS — M54.2 CERVICALGIA: ICD-10-CM

## 2024-09-10 DIAGNOSIS — M54.12 CERVICAL RADICULOPATHY: Primary | ICD-10-CM

## 2024-09-10 PROBLEM — M81.0 AGE-RELATED OSTEOPOROSIS WITHOUT CURRENT PATHOLOGICAL FRACTURE: Status: ACTIVE | Noted: 2022-07-12

## 2024-09-10 NOTE — ASSESSMENT & PLAN NOTE
See echo and Holter monitor results.  Patient is not on beta-blocker.  Does endorse chronic fatigue and weakness.  Referral to Cardiology

## 2024-09-10 NOTE — ASSESSMENT & PLAN NOTE
Echo 6/24/24  Summary  Show Result Comparison     Left Ventricle: The left ventricle is normal in size. Normal wall thickness. There is normal systolic function. Biplane (2D) method of discs ejection fraction is 60%.    Right Ventricle: Normal right ventricular cavity size. Systolic function is normal.    Left Atrium: Left atrium is mildly dilated.    Right Atrium: Right atrium is dilated.    Aortic Valve: The aortic valve is a trileaflet valve. There is no stenosis. Aortic valve peak velocity is 0.85 m/s. Mean gradient is 2 mmHg.    Mitral Valve: The mitral valve is structurally normal. The mean pressure gradient across the mitral valve is 1 mmHg at a heart rate of 68 bpm. There is trace regurgitation.    Tricuspid Valve: The tricuspid valve is structurally normal. There is mild regurgitation.    Pulmonary Artery: The estimated pulmonary artery systolic pressure is 21 mmHg.    IVC/SVC: Normal venous pressure at 3 mmHg.    Referral to cardiology for further eval and continue management

## 2024-09-10 NOTE — ASSESSMENT & PLAN NOTE
Stable symptoms though attributes some of her fatigue to chronic cervicalgia. Encouraged her to complete XR cervical spine and referral for PT to be recommended once complete   See previous note: Patient reports chronic cervicalgia x 30 years and previously was being treated by chiropractor with relief however unable to continue out of pocket costs. Endorses radiculopathy and numbness b/l hands and left elbow.   XR cervical spine, pt would benefit from referral to PT  Ok to continue OTC Advil for severe pain relief or OTC Tylenol

## 2024-09-10 NOTE — ASSESSMENT & PLAN NOTE
Lab Results   Component Value Date    HGBA1C 5.5 09/03/2024     Patient with symptoms suspicious for hypoglycemia episodes-- see HPI for details  Discussed with patient importance of monitoring blood sugar and adequate frequent meals to avoid long periods of time without eating to maintain a normal blood sugar   Referral to nutrition  Prescription for glucometer and supplies  Patient may benefit from referral to endocrinology however wait time is extended at this time.  Patient to notify provider for any continued or worsening symptoms for sooner appointment

## 2024-09-10 NOTE — ASSESSMENT & PLAN NOTE
At last visit she reported:  Palpitations occurring every few days and associated with dizziness.   Had negative CT head 1/2024, prior Carotid US negative 2017  Denies any further testing previously completed  Echo, Holter, Labs  EKG done 1/2024 reviewed     Today inform patient of results of echo and Holter monitor.  Referral to cardiology for further eval

## 2024-09-10 NOTE — TELEPHONE ENCOUNTER
Contacted using  Jonn[employer] Rome [id number].Informed neck x ray results and referral to physical therapy in [place]. Voiced understanding.

## 2024-09-10 NOTE — ASSESSMENT & PLAN NOTE
DEXA 9/2024  Impression     The measured bone mineral density in the bilateral femoral necks corresponds with the osteoporotic category according to world health organization criteria.    Daily calcium and vitamin D supplementation: Calcium 1500 mg daily; high calcium foods include salmon or sardines with bones, low fat yogurt, skim milk, green vegetables, and cheese. Vitamin D 3 2000 IU once daily; high vitamin D foods include mushrooms, fish oil, cod liver, salmon, tuna, egg yolks, milk fortified with Vitamin D and foods fortified with Vitamin D such as cereals and oatmeal.  Decrease/ Avoid use of alcohol, tobacco use, caffeine.  Educated on health benefits of at least 5 days/ week of 30 minutes moderate intensity exercise (brisk walking) and 2 or more days/ week of muscle strength activities  Fall precautions discussed such as removing scatter and clutter in home, use hand rails when walking up stairs, proper foot wear, and use of bright lights in home especially at night.   Reviewed proper administration of rx: take with 6-8 ounces of water 0.5 hour breakfast or any medication and remain upright after taking medication.

## 2024-09-10 NOTE — ASSESSMENT & PLAN NOTE
Patient with continued chronic fatigue without known etiology.  Echo overall without significant findings.  Holter monitor results were within normal limits.  She previously had carotid ultrasound and CT head.  Lab results reviewed.  Patient symptoms with history with potential episodes of hypoglycemia occurring.  See hypoglycemia.  Referral to Cardiology for further eval of continued fatigue with regard to echo and Holter monitor results as well as in the setting of bradycardia

## 2024-09-10 NOTE — TELEPHONE ENCOUNTER
Please inform patient neck x-ray shows findings of arthritis. Recommend PT and ordered referral to [facility] in [place].     Thank you

## 2024-09-25 ENCOUNTER — TELEPHONE (OUTPATIENT)
Dept: INTERNAL MEDICINE | Facility: CLINIC | Age: 59
End: 2024-09-25
Payer: MEDICAID

## 2024-09-25 NOTE — TELEPHONE ENCOUNTER
----- Message from Nancy Gomez NP sent at 9/24/2024  7:56 AM CDT -----  Please let pt know cologuard (stool test) was normal. It is recommended to be repeated in 3 years for screening.

## 2024-09-25 NOTE — TELEPHONE ENCOUNTER
Contacted using [employer]  Long # [id number] . Informed of normal Cologuard results, will repeat in 3 years. Voiced understanding.

## 2024-12-09 ENCOUNTER — OFFICE VISIT (OUTPATIENT)
Dept: INTERNAL MEDICINE | Facility: CLINIC | Age: 59
End: 2024-12-09
Payer: MEDICAID

## 2024-12-09 ENCOUNTER — OFFICE VISIT (OUTPATIENT)
Dept: CARDIOLOGY | Facility: CLINIC | Age: 59
End: 2024-12-09
Payer: MEDICAID

## 2024-12-09 VITALS
SYSTOLIC BLOOD PRESSURE: 120 MMHG | OXYGEN SATURATION: 99 % | WEIGHT: 102 LBS | HEIGHT: 60 IN | TEMPERATURE: 98 F | BODY MASS INDEX: 20.03 KG/M2 | RESPIRATION RATE: 20 BRPM | DIASTOLIC BLOOD PRESSURE: 76 MMHG | HEART RATE: 56 BPM

## 2024-12-09 VITALS
HEIGHT: 61 IN | RESPIRATION RATE: 20 BRPM | OXYGEN SATURATION: 100 % | SYSTOLIC BLOOD PRESSURE: 108 MMHG | DIASTOLIC BLOOD PRESSURE: 70 MMHG | HEART RATE: 56 BPM | TEMPERATURE: 98 F | BODY MASS INDEX: 19.14 KG/M2 | WEIGHT: 101.38 LBS

## 2024-12-09 DIAGNOSIS — M54.12 CERVICAL RADICULOPATHY: Primary | ICD-10-CM

## 2024-12-09 DIAGNOSIS — R42 DIZZINESS: ICD-10-CM

## 2024-12-09 DIAGNOSIS — R00.2 PALPITATIONS: Primary | ICD-10-CM

## 2024-12-09 DIAGNOSIS — M81.0 AGE-RELATED OSTEOPOROSIS WITHOUT CURRENT PATHOLOGICAL FRACTURE: ICD-10-CM

## 2024-12-09 DIAGNOSIS — I51.7 ATRIAL ENLARGEMENT, RIGHT: ICD-10-CM

## 2024-12-09 DIAGNOSIS — R53.83 OTHER FATIGUE: ICD-10-CM

## 2024-12-09 DIAGNOSIS — R00.1 BRADYCARDIA: ICD-10-CM

## 2024-12-09 DIAGNOSIS — E78.2 MIXED HYPERLIPIDEMIA: ICD-10-CM

## 2024-12-09 DIAGNOSIS — R00.0 TACHYCARDIA, UNSPECIFIED: ICD-10-CM

## 2024-12-09 PROCEDURE — 3044F HG A1C LEVEL LT 7.0%: CPT | Mod: CPTII,,, | Performed by: NURSE PRACTITIONER

## 2024-12-09 PROCEDURE — 3074F SYST BP LT 130 MM HG: CPT | Mod: CPTII,,, | Performed by: NURSE PRACTITIONER

## 2024-12-09 PROCEDURE — 99214 OFFICE O/P EST MOD 30 MIN: CPT | Mod: S$PBB,,, | Performed by: NURSE PRACTITIONER

## 2024-12-09 PROCEDURE — 99214 OFFICE O/P EST MOD 30 MIN: CPT | Mod: PBBFAC | Performed by: INTERNAL MEDICINE

## 2024-12-09 PROCEDURE — 3078F DIAST BP <80 MM HG: CPT | Mod: CPTII,,, | Performed by: NURSE PRACTITIONER

## 2024-12-09 PROCEDURE — 99214 OFFICE O/P EST MOD 30 MIN: CPT | Mod: PBBFAC,27 | Performed by: NURSE PRACTITIONER

## 2024-12-09 PROCEDURE — 3008F BODY MASS INDEX DOCD: CPT | Mod: CPTII,,, | Performed by: NURSE PRACTITIONER

## 2024-12-09 RX ORDER — BLOOD-GLUCOSE METER
EACH MISCELLANEOUS
COMMUNITY
Start: 2024-09-09

## 2024-12-09 NOTE — ASSESSMENT & PLAN NOTE
Lab Results   Component Value Date    CHOL 215 (H) 09/03/2024     Lab Results   Component Value Date    HDL 74 (H) 09/03/2024     Lab Results   Component Value Date    TRIG 126 09/03/2024     Lab Results   Component Value Date    .00 09/03/2024     Avoid tobacco/ alcohol  Follow low fat/low cholesterol diet such as avoid/ decrease zuniga, sausage, fried foods, cookies, cakes, chips, cheese, whole milk, butter, mayonnaise. Add olive oil, avocados, lean meats, fresh fruits/ vegetables, heart healthy nuts to diet.  Educated on health benefits of exercise 5 days/ week of at least 30 minutes moderate intensity exercise (brisk walking) and 2 days/ week of muscle strength activities

## 2024-12-09 NOTE — PROGRESS NOTES
CHIEF COMPLAINT:   Chief Complaint   Patient presents with    Referral     Bradycardia  Fatigue  Denies chest pains or SOB    Dizziness                   Review of patient's allergies indicates:  No Known Allergies                                       HPI:  Laura Alexander 59 y.o. female - She presents to cardiology clinic today as a new patient. Records show she has been experiencing dizziness in the morning that is relieved when she gets up and eats breakfast. She also experiences dizziness when she bends over and upright quickly and shakes her head from side to side quickly.  Patient reports that she has been experiencing brief runs of tachycardia that last for several seconds and can happen a few times a week while some weeks she is asymptomatic.  She denied chest pains or SOB. Echo showed EF of 60% and was reviewed by Dr. Posey with results as described below. Holter monitor was mostly sinus rhythm and was negative for any concerning findings. Patient also reports palpitations that she associates with these episodes of vertigo. 2017 carotid ultrasound was negative.  Patient reports that she only drinks 116 oz bottle of water per day and no other fluids.  Patient does report some bouts of diarrhea she can have diarrhea up to 2 to 3 times a day and then go several weeks without having any symptoms.                                                                                                                                                                                                                                                                                                                                                                                                                                                                                        CARDIAC TESTING:  Results for orders placed during the hospital encounter of 08/20/24    Echo    Interpretation by Dr. Posey   -ejection fraction  was preserved at 60%   -atrial size were within normal limits   -trace tricuspid regurg was present.         Interpretation Summary    Left Ventricle: The left ventricle is normal in size. Normal wall thickness. There is normal systolic function. Biplane (2D) method of discs ejection fraction is 60%.    Right Ventricle: Normal right ventricular cavity size. Systolic function is normal.    Left Atrium: Left atrium is mildly dilated.    Right Atrium: Right atrium is dilated.    Aortic Valve: The aortic valve is a trileaflet valve. There is no stenosis. Aortic valve peak velocity is 0.85 m/s. Mean gradient is 2 mmHg.    Mitral Valve: The mitral valve is structurally normal. The mean pressure gradient across the mitral valve is 1 mmHg at a heart rate of 68 bpm. There is trace regurgitation.    Tricuspid Valve: The tricuspid valve is structurally normal. There is mild regurgitation.    Pulmonary Artery: The estimated pulmonary artery systolic pressure is 21 mmHg.    IVC/SVC: Normal venous pressure at 3 mmHg.    No results found for this or any previous visit.     No results found for this or any previous visit.       Patient Active Problem List   Diagnosis    Hyperlipidemia    Cervicalgia    Cervical radiculopathy    Seasonal allergies    Palpitations    Dizziness    Hypoglycemia    Other fatigue    Age-related osteoporosis without current pathological fracture    Bradycardia    Atrial enlargement, right     Past Surgical History:   Procedure Laterality Date    appendectomy      BILATERAL TUBAL LIGATION      HYSTERECTOMY       Social History     Socioeconomic History    Marital status:     Number of children: 0   Tobacco Use    Smoking status: Never    Smokeless tobacco: Never   Substance and Sexual Activity    Alcohol use: Never    Drug use: Never    Sexual activity: Yes     Social Drivers of Health     Transportation Needs: No Transportation Needs (7/12/2022)    PRAPARE - Transportation     Lack of  Transportation (Medical): No     Lack of Transportation (Non-Medical): No   Housing Stability: Low Risk  (7/12/2022)    Housing Stability Vital Sign     Unable to Pay for Housing in the Last Year: No     Number of Places Lived in the Last Year: 1     Unstable Housing in the Last Year: No        Family History   Problem Relation Name Age of Onset    Hypertension Mother      Breast cancer Paternal Aunt           Current Outpatient Medications:     alendronate (FOSAMAX) 70 MG tablet, Take 1 tablet (70 mg total) by mouth every 7 days., Disp: 4 tablet, Rfl: 11    blood sugar diagnostic Strp, To check BG one times daily, to use with insurance preferred meter, Disp: 100 each, Rfl: 0    blood-glucose meter kit, To check BG one times daily, to use with insurance preferred meter, Disp: 1 each, Rfl: 0    calcium-vitamin D 250 mg-2.5 mcg (100 unit) per tablet, Take 1 tablet by mouth Daily., Disp: , Rfl:     lancets Misc, To check BG one times daily, to use with insurance preferred meter, Disp: 100 each, Rfl: 0    omega-3 fatty acids/fish oil (FISH OIL-OMEGA-3 FATTY ACIDS) 300-1,000 mg capsule, Take by mouth once daily., Disp: , Rfl:     TRUE METRIX GLUCOSE METER Misc, USE TO TEST EVERY DAY, Disp: , Rfl:     vitamin D (VITAMIN D3) 1000 units Tab, Take 1,000 Units by mouth once daily., Disp: , Rfl:      ROS:                                                                                                                                                                             Review of Systems   Constitutional:  Negative for chills and fever.   HENT:  Negative for ear pain and hearing loss.    Eyes:  Negative for pain and redness.   Respiratory:  Negative for cough and shortness of breath.    Cardiovascular:  Positive for palpitations. Negative for chest pain.        Patient is having sensation of fast heart rate 2 to 3 times a week with some weeks being asymptomatic.   Gastrointestinal:  Positive for diarrhea. Negative for  constipation, nausea and vomiting.   Genitourinary:  Negative for dysuria and hematuria.   Musculoskeletal:  Negative for myalgias.   Skin:  Negative for rash.   Neurological:  Positive for dizziness. Negative for seizures and headaches.   Psychiatric/Behavioral:  Negative for depression.         There were no vitals taken for this visit.   PE:  Physical Exam  Vitals reviewed.   Constitutional:       Appearance: Normal appearance.   HENT:      Head: Normocephalic and atraumatic.   Eyes:      General: No scleral icterus.     Extraocular Movements: Extraocular movements intact.      Pupils: Pupils are equal, round, and reactive to light.   Neck:      Vascular: No carotid bruit.   Cardiovascular:      Rate and Rhythm: Normal rate and regular rhythm.      Pulses: Normal pulses.      Heart sounds: Normal heart sounds. No murmur heard.     No friction rub. No gallop.   Pulmonary:      Effort: Pulmonary effort is normal. No respiratory distress.      Breath sounds: Normal breath sounds. No stridor.   Abdominal:      General: Abdomen is flat. There is no distension.   Musculoskeletal:         General: Normal range of motion.      Cervical back: Normal range of motion and neck supple. No tenderness.      Right lower leg: No edema.      Left lower leg: No edema.   Skin:     General: Skin is warm and dry.      Findings: No erythema.   Neurological:      General: No focal deficit present.      Mental Status: She is alert and oriented to person, place, and time.   Psychiatric:         Mood and Affect: Mood normal.         Behavior: Behavior normal.        The 10-year ASCVD risk score (Lion ENGLAND, et al., 2019) is: 1.8%    Values used to calculate the score:      Age: 59 years      Sex: Female      Is Non- : No      Diabetic: No      Tobacco smoker: No      Systolic Blood Pressure: 108 mmHg      Is BP treated: No      HDL Cholesterol: 74 mg/dL      Total Cholesterol: 215  mg/dL          ASSESSMENT/PLAN:    Tachycardia/palpitations  Hyperlipidemia   Vertigo   For fluid intake   -patient is experiencing tachycardia at home.  -patient is to day Holter monitor was negative for any concerning findings.  Patient reports that she did not experience any symptoms while wearing the Holter monitor.    -previous EKGs have been normal.    -previous echos have been unremarkable.    -at this time, we will plan for 30 day outpatient telemetry event monitor.    -encouraged increased p.o. fluid intake.    -patient's most recent lipid profile was encouraging.  , HDL 74, total 215.    -patient's ASCVD risk is 1.8% today.    -we will review findings from patient to return to clinic in 4 months.          RTC 4 months     DO EVELYN ThomasonU, Internal Medicine, PGY-I

## 2024-12-09 NOTE — PROGRESS NOTES
Nancy L Jason, NP   [facility]  [address] [address]      PATIENT NAME: Laura Alexander  : 1965  DATE: 24  MRN: [id number]        History of Present Illness / Problem Focused Workflow     Laura Alexander presents to the clinic with Follow-up and Hypoglycemia (3 month f/u for hypoglycemia and neck f/u)     58 yo [place]ian female accompanied by her  for 3 mo follow up. Last seen 24. She states has been feeling better. Denies any dizziness or fatigue. Continues with neck pain with radiculopathy to right arm (eblow and right hand) with intermittent numbness. Not interested in MRI at this time. Did not complete labs before visit. No other concerns stated. Has Cardio appt this morning.    Other providers   [facility] cardiology   [facility] GI- Colonoscopy    Review of Systems     Review of Systems   Constitutional: Negative.    HENT: Negative.     Eyes: Negative.    Respiratory: Negative.     Cardiovascular: Negative.    Gastrointestinal: Negative.    Endocrine: Negative.    Genitourinary: Negative.    Musculoskeletal:  Positive for neck pain.   Skin: Negative.    Allergic/Immunologic: Negative.    Neurological: Negative.    Hematological: Negative.    Psychiatric/Behavioral: Negative.         Medical / Social / Family History     -------------------------------------    HLD (hyperlipidemia)    Need for vaccination        Past Surgical History:   Procedure Laterality Date    appendectomy      BILATERAL TUBAL LIGATION      HYSTERECTOMY         Social History     Socioeconomic History    Marital status:     Number of children: 0   Tobacco Use    Smoking status: Never    Smokeless tobacco: Never   Substance and Sexual Activity    Alcohol use: Never    Drug use: Never    Sexual activity: Yes     Social Drivers of Health     Transportation Needs: No Transportation Needs (2022)    PRAPARE - Transportation     Lack of  Transportation (Medical): No     Lack of Transportation (Non-Medical): No   Housing Stability: Low Risk  (7/12/2022)    Housing Stability Vital Sign     Unable to Pay for Housing in the Last Year: No     Number of Places Lived in the Last Year: 1     Unstable Housing in the Last Year: No        Family History   Problem Relation Name Age of Onset    Hypertension Mother      Breast cancer Paternal Aunt          Medications and Allergies     Medications  Current Outpatient Medications   Medication Instructions    alendronate (FOSAMAX) 70 mg, Oral, Every 7 days    blood sugar diagnostic Strp To check BG one times daily, to use with insurance preferred meter    blood-glucose meter kit To check BG one times daily, to use with insurance preferred meter    calcium-vitamin D 250 mg-2.5 mcg (100 unit) per tablet 1 tablet, Daily    lancets Misc To check BG one times daily, to use with insurance preferred meter    omega-3 fatty acids/fish oil (FISH OIL-OMEGA-3 FATTY ACIDS) 300-1,000 mg capsule Daily    TRUE METRIX GLUCOSE METER Misc USE TO TEST EVERY DAY    vitamin D (VITAMIN D3) 1,000 Units, Daily       Allergies  Review of patient's allergies indicates:  No Known Allergies    Physical Examination     Visit Vitals  /76   Pulse (!) 56   Temp 97.9 °F (36.6 °C) (Oral)   Resp 20   Ht 5' (1.524 m)   Wt 46.3 kg (102 lb)   SpO2 99%   BMI 19.92 kg/m²       Physical Exam  Constitutional:       General: She is not in acute distress.     Appearance: Normal appearance. She is not ill-appearing or diaphoretic.   HENT:      Head: Normocephalic.   Cardiovascular:      Rate and Rhythm: Normal rate and regular rhythm.      Heart sounds: No murmur heard.  Pulmonary:      Effort: Pulmonary effort is normal. No respiratory distress.      Breath sounds: Normal breath sounds. No stridor. No wheezing, rhonchi or rales.   Musculoskeletal:      Cervical back: Tenderness present.   Skin:     General: Skin is warm and dry.   Neurological:       Mental Status: She is alert and oriented to person, place, and time. Mental status is at baseline.      Coordination: Coordination normal.      Gait: Gait normal.   Psychiatric:         Mood and Affect: Mood normal.         Behavior: Behavior normal.         Thought Content: Thought content normal.         Judgment: Judgment normal.           Results     Lab Results   Component Value Date    WBC 7.83 01/19/2024    RBC 4.65 01/19/2024    HGB 13.4 01/19/2024    HCT 40.8 01/19/2024    MCV 87.7 01/19/2024    MCH 28.8 01/19/2024    MCHC 32.8 (L) 01/19/2024    RDW 12.4 01/19/2024     01/19/2024    MPV 8.2 01/19/2024     Sodium   Date Value Ref Range Status   01/19/2024 140 136 - 145 mmol/L Final     Potassium   Date Value Ref Range Status   01/19/2024 3.5 3.5 - 5.1 mmol/L Final     Chloride   Date Value Ref Range Status   01/19/2024 106 98 - 107 mmol/L Final     CO2   Date Value Ref Range Status   01/19/2024 25 22 - 29 mmol/L Final     Blood Urea Nitrogen   Date Value Ref Range Status   01/19/2024 12.0 9.8 - 20.1 mg/dL Final     Creatinine   Date Value Ref Range Status   01/19/2024 0.76 0.55 - 1.02 mg/dL Final     Calcium   Date Value Ref Range Status   01/19/2024 9.3 8.4 - 10.2 mg/dL Final     Albumin   Date Value Ref Range Status   01/19/2024 4.3 3.5 - 5.0 g/dL Final     Bilirubin Total   Date Value Ref Range Status   01/19/2024 0.7 <=1.5 mg/dL Final     ALP   Date Value Ref Range Status   01/19/2024 65 40 - 150 unit/L Final     AST   Date Value Ref Range Status   01/19/2024 19 5 - 34 unit/L Final     ALT   Date Value Ref Range Status   01/19/2024 14 0 - 55 unit/L Final     Estimated GFR-Non    Date Value Ref Range Status   10/13/2021 97 >>=90 mL/min/1.73 m2 Final     Lab Results   Component Value Date    CHOL 215 (H) 09/03/2024     Lab Results   Component Value Date    HDL 74 (H) 09/03/2024     Lab Results   Component Value Date    TRIG 126 09/03/2024     Lab Results   Component Value Date    LDL  116.00 09/03/2024     Lab Results   Component Value Date    TSH 1.336 09/03/2024     Lab Results   Component Value Date    PHUR 6.0 09/03/2024    PROTEINUA Negative 09/03/2024    GLUCUA Normal 09/03/2024    KETONESU Negative 10/13/2021    OCCULTUA Negative 09/03/2024    NITRITE Negative 09/03/2024    LEUKOCYTESUR Negative 09/03/2024     Lab Results   Component Value Date    HGBA1C 5.5 09/03/2024    HGBA1C 5.5 10/13/2021    HGBA1C 5.7 01/08/2020     Lab Results   Component Value Date    MICALBCREAT  09/03/2024      Comment:      UNABLE TO CALC        Assessment       ICD-10-CM ICD-9-CM   1. Cervical radiculopathy  M54.12 723.4   2. Mixed hyperlipidemia  E78.2 272.2   3. Age-related osteoporosis without current pathological fracture  M81.0 733.01   4. Other fatigue  R53.83 780.79   5. Dizziness  R42 780.4       Plan       Problem List Items Addressed This Visit          Neuro    Cervical radiculopathy - Primary    Current Assessment & Plan     Re refer to PT as requested.   Referral printed and provided to pt/  to follow up regarding.   Pt declined MRI   Notify provider for worsening symptoms for sooner appointment.            Cardiac/Vascular    Hyperlipidemia    Current Assessment & Plan     Lab Results   Component Value Date    CHOL 215 (H) 09/03/2024     Lab Results   Component Value Date    HDL 74 (H) 09/03/2024     Lab Results   Component Value Date    TRIG 126 09/03/2024     Lab Results   Component Value Date    .00 09/03/2024     Avoid tobacco/ alcohol  Follow low fat/low cholesterol diet such as avoid/ decrease zuniga, sausage, fried foods, cookies, cakes, chips, cheese, whole milk, butter, mayonnaise. Add olive oil, avocados, lean meats, fresh fruits/ vegetables, heart healthy nuts to diet.  Educated on health benefits of exercise 5 days/ week of at least 30 minutes moderate intensity exercise (brisk walking) and 2 days/ week of muscle strength activities             Relevant Orders    [facility]  Differential    Comprehensive Metabolic Panel    Lipid Panel       Endocrine    Age-related osteoporosis without current pathological fracture    Overview     >>OVERVIEW FOR OSTEOPENIA WRITTEN ON 6/24/2024  9:33 AM BY NANCY ARMSTRONG NP    DEXA 8/2022            Other    Dizziness    Current Assessment & Plan     Denies any continued dizziness/ fatigue.         Other fatigue       Future Appointments   Date Time Provider Department Center   4/9/2025  9:00 AM Therese Posey MD [facility]   6/9/2025  8:40 AM Nancy Armstrong NP [facility]        Follow up in about 6 months (around 6/9/2025) for with fasting labs before visit.    Signature:  Nancy Armstrong NP  [facility]  [address] [address]    Date of encounter: 12/9/24

## 2024-12-09 NOTE — PROGRESS NOTES
Cardiology attending attestation  Patient was seen and examined with Dr. Manuel Wong. Agree with plan as outlined below.    Therese Posey MD  Cardiology staff-[facility]

## 2024-12-09 NOTE — ASSESSMENT & PLAN NOTE
Re refer to PT as requested.   Referral printed and provided to pt/  to follow up regarding.   Pt declined MRI   Notify provider for worsening symptoms for sooner appointment.

## 2025-01-08 ENCOUNTER — HOSPITAL ENCOUNTER (OUTPATIENT)
Dept: CARDIOLOGY | Facility: HOSPITAL | Age: 60
Discharge: HOME | End: 2025-01-08
Payer: MEDICAID

## 2025-01-08 DIAGNOSIS — Z12.11 COLON CANCER SCREENING: Primary | ICD-10-CM

## 2025-01-08 DIAGNOSIS — R00.0 TACHYCARDIA, UNSPECIFIED: ICD-10-CM

## 2025-01-08 DIAGNOSIS — R00.2 PALPITATIONS: ICD-10-CM

## 2025-01-08 RX ORDER — POLYETHYLENE GLYCOL 3350, SODIUM SULFATE, SODIUM CHLORIDE, POTASSIUM CHLORIDE, SODIUM ASCORBATE, AND ASCORBIC ACID 7.5-2.691G
2000 KIT ORAL SEE ADMIN INSTRUCTIONS
Qty: 1 KIT | Refills: 0 | Status: SHIPPED | OUTPATIENT
Start: 2025-01-08

## 2025-01-08 NOTE — TELEPHONE ENCOUNTER
----- Message from Sosa sent at 7/18/2024 12:55 PM CDT -----  Regarding: Prep juan 2/17/25  Moviprep ouhc

## 2025-02-06 ENCOUNTER — TELEPHONE (OUTPATIENT)
Dept: ENDOSCOPY | Facility: HOSPITAL | Age: 60
End: 2025-02-06
Payer: MEDICAID

## 2025-02-06 NOTE — TELEPHONE ENCOUNTER
First attempt two-week call via S Id #[id number]. Patient date of birth verified. Spoke with pt's boyfriend Aleks who states, he will bring patient to  prep/instructions and to complete pre-call interview. MS

## 2025-02-15 ENCOUNTER — ANESTHESIA EVENT (OUTPATIENT)
Dept: ENDOSCOPY | Facility: HOSPITAL | Age: 60
End: 2025-02-15
Payer: MEDICAID

## 2025-02-15 NOTE — ANESTHESIA PREPROCEDURE EVALUATION
02/15/2025  Laura Alexander is a 59 y.o., female with PMHx of HLD presents for screening colonoscopy.    NO BETA BLOCKER USE    Active Ambulatory Problems     Diagnosis Date Noted    Hyperlipidemia 07/12/2022    Cervicalgia 06/24/2024    Cervical radiculopathy 06/24/2024    Seasonal allergies 06/24/2024    Palpitations 06/24/2024    Dizziness 06/24/2024    Hypoglycemia 09/09/2024    Other fatigue 09/09/2024    Age-related osteoporosis without current pathological fracture 07/12/2022    Bradycardia 09/09/2024    Atrial enlargement, right 09/09/2024     Resolved Ambulatory Problems     Diagnosis Date Noted    Need for vaccination 07/12/2022     Past Medical History:   Diagnosis Date    HLD (hyperlipidemia)          Pre-op Assessment    I have reviewed the Patient Summary Reports.     I have reviewed the Nursing Notes. I have reviewed the NPO Status.   I have reviewed the Medications.     Review of Systems  Anesthesia Hx:  No problems with previous Anesthesia   History of prior surgery of interest to airway management or planning:          Denies Family Hx of Anesthesia complications.    Denies Personal Hx of Anesthesia complications.                    Hematology/Oncology:  Hematology Normal   Oncology Normal                                   EENT/Dental:  EENT/Dental Normal           Cardiovascular:  Cardiovascular Normal                                              Pulmonary:  Pulmonary Normal                       Renal/:  Renal/ Normal                 Hepatic/GI:  Hepatic/GI Normal                    Musculoskeletal:  Musculoskeletal Normal                Neurological:  Neurology Normal                                      Endocrine:  Endocrine Normal            Dermatological:  Skin Normal    Psych:  Psychiatric Normal                    Physical Exam  General: Alert    Airway:  Mallampati: I /  I  Mouth Opening: Normal  TM Distance: Normal  Tongue: Normal  Neck ROM: Normal ROM    Dental:  Intact      Lab Results   Component Value Date    WBC 7.83 01/19/2024    HGB 13.4 01/19/2024    HCT 40.8 01/19/2024    MCV 87.7 01/19/2024     01/19/2024       CMP  Sodium   Date Value Ref Range Status   01/19/2024 140 136 - 145 mmol/L Final     Potassium   Date Value Ref Range Status   01/19/2024 3.5 3.5 - 5.1 mmol/L Final     Chloride   Date Value Ref Range Status   01/19/2024 106 98 - 107 mmol/L Final     CO2   Date Value Ref Range Status   01/19/2024 25 22 - 29 mmol/L Final     Blood Urea Nitrogen   Date Value Ref Range Status   01/19/2024 12.0 9.8 - 20.1 mg/dL Final     Creatinine   Date Value Ref Range Status   01/19/2024 0.76 0.55 - 1.02 mg/dL Final     Calcium   Date Value Ref Range Status   01/19/2024 9.3 8.4 - 10.2 mg/dL Final     Albumin   Date Value Ref Range Status   01/19/2024 4.3 3.5 - 5.0 g/dL Final     Bilirubin Total   Date Value Ref Range Status   01/19/2024 0.7 <=1.5 mg/dL Final     ALP   Date Value Ref Range Status   01/19/2024 65 40 - 150 unit/L Final     AST   Date Value Ref Range Status   01/19/2024 19 5 - 34 unit/L Final     ALT   Date Value Ref Range Status   01/19/2024 14 0 - 55 unit/L Final     eGFR   Date Value Ref Range Status   01/19/2024 >60 mls/min/1.73/m2 Final       CARDS OV 12/9/24        Anesthesia Plan  Type of Anesthesia, risks & benefits discussed:    Anesthesia Type: Gen Natural Airway  Intra-op Monitoring Plan: Standard ASA Monitors  Post Op Pain Control Plan: IV/PO Opioids PRN  (medical reason for not using multimodal pain management)  Induction:  IV  Informed Consent: Informed consent signed with the Patient and all parties understand the risks and agree with anesthesia plan.  All questions answered. Patient consented to blood products? No  ASA Score: 1  Day of Surgery Review of History & Physical: H&P Update referred to the surgeon/provider.    Ready For Surgery From  Anesthesia Perspective.     .

## 2025-02-17 ENCOUNTER — HOSPITAL ENCOUNTER (OUTPATIENT)
Facility: HOSPITAL | Age: 60
Discharge: HOME | End: 2025-02-17
Attending: INTERNAL MEDICINE | Admitting: INTERNAL MEDICINE
Payer: MEDICAID

## 2025-02-17 ENCOUNTER — ANESTHESIA (OUTPATIENT)
Dept: ENDOSCOPY | Facility: HOSPITAL | Age: 60
End: 2025-02-17
Payer: MEDICAID

## 2025-02-17 DIAGNOSIS — Z12.11 SCREEN FOR COLON CANCER: Primary | ICD-10-CM

## 2025-02-17 LAB
POCT GLUCOSE: 38 MG/DL (ref 70–110)
POCT GLUCOSE: 80 MG/DL (ref 70–110)

## 2025-02-17 PROCEDURE — 63600175 PHARM REV CODE 636 W HCPCS: Performed by: NURSE ANESTHETIST, CERTIFIED REGISTERED

## 2025-02-17 PROCEDURE — 37000008 HC ANESTHESIA 1ST 15 MINUTES: Performed by: INTERNAL MEDICINE

## 2025-02-17 PROCEDURE — 37000009 HC ANESTHESIA EA ADD 15 MINS: Performed by: INTERNAL MEDICINE

## 2025-02-17 PROCEDURE — 82962 GLUCOSE BLOOD TEST: CPT | Performed by: INTERNAL MEDICINE

## 2025-02-17 PROCEDURE — 45378 DIAGNOSTIC COLONOSCOPY: CPT | Performed by: INTERNAL MEDICINE

## 2025-02-17 PROCEDURE — 25000003 PHARM REV CODE 250: Performed by: INTERNAL MEDICINE

## 2025-02-17 RX ORDER — LIDOCAINE HYDROCHLORIDE 20 MG/ML
INJECTION, SOLUTION EPIDURAL; INFILTRATION; INTRACAUDAL; PERINEURAL
Status: DISCONTINUED | OUTPATIENT
Start: 2025-02-17 | End: 2025-02-17

## 2025-02-17 RX ORDER — DEXTROMETHORPHAN/PSEUDOEPHED 2.5-7.5/.8
DROPS ORAL
Status: COMPLETED | OUTPATIENT
Start: 2025-02-17 | End: 2025-02-17

## 2025-02-17 RX ORDER — LIDOCAINE HYDROCHLORIDE 10 MG/ML
1 INJECTION, SOLUTION EPIDURAL; INFILTRATION; INTRACAUDAL; PERINEURAL ONCE
Status: DISCONTINUED | OUTPATIENT
Start: 2025-02-17 | End: 2025-02-17 | Stop reason: HOSPADM

## 2025-02-17 RX ORDER — SODIUM CHLORIDE, SODIUM LACTATE, POTASSIUM CHLORIDE, CALCIUM CHLORIDE 600; 310; 30; 20 MG/100ML; MG/100ML; MG/100ML; MG/100ML
INJECTION, SOLUTION INTRAVENOUS CONTINUOUS
Status: DISCONTINUED | OUTPATIENT
Start: 2025-02-17 | End: 2025-02-17 | Stop reason: HOSPADM

## 2025-02-17 RX ORDER — PROPOFOL 10 MG/ML
VIAL (ML) INTRAVENOUS
Status: DISCONTINUED | OUTPATIENT
Start: 2025-02-17 | End: 2025-02-17

## 2025-02-17 RX ADMIN — PROPOFOL 30 MG: 10 INJECTION, EMULSION INTRAVENOUS at 11:02

## 2025-02-17 RX ADMIN — PROPOFOL 50 MG: 10 INJECTION, EMULSION INTRAVENOUS at 11:02

## 2025-02-17 RX ADMIN — LIDOCAINE HYDROCHLORIDE 50 MG: 20 INJECTION, SOLUTION EPIDURAL; INFILTRATION; INTRACAUDAL; PERINEURAL at 11:02

## 2025-02-17 NOTE — H&P
Colonoscopy History and Physical    Patient Name: Laura Alexander  MRN: [id number]  : 1965  Date of Procedure:  2025  Referring Physician: Nancy Gomez NP  Primary Physician: Nancy Gomez NP  Procedure Physician: Kristen Monroy MD, MPH     Procedure - Colonoscopy  ASA - per anesthesia  Mallampati - per anesthesia  History of Anesthesia problems - no  Family history Anesthesia problems -  no   Plan of anesthesia - General    Diagnosis: screening for colon cancer  Chief Complaint: Same as above    HPI: Patient is an 59 y.o. female is here for the above.     Ms. Alexander is a 59 year old Arabic female here for a screening colonoscopy.    She has regular stools daily without any constipation, diarrhea, rectal bleeding or melena.  She denies any abdominal pain, weight loss.  She denies any FH of colon polyps or colon cancer.  This is her first colonoscopy    Last colonoscopy: none  Family history: denies  Anticoagulation: none    ROS:  Constitutional: No fevers, chills, No weight loss  CV: No chest pain  Pulm: No cough, No shortness of breath  GI: see HPI    Medical History:   Past Medical History:   Diagnosis Date    HLD (hyperlipidemia)     Need for vaccination 2022         Surgical History:   Past Surgical History:   Procedure Laterality Date    appendectomy      BILATERAL TUBAL LIGATION      HYSTERECTOMY         Family History:   Family History   Problem Relation Name Age of Onset    Hypertension Mother      Breast cancer Paternal Aunt     .    Social History:   Social History     Socioeconomic History    Marital status:     Number of children: 0   Tobacco Use    Smoking status: Never    Smokeless tobacco: Never   Substance and Sexual Activity    Alcohol use: Never    Drug use: Never    Sexual activity: Yes     Social Drivers of Health     Transportation Needs: No Transportation Needs (2022)    PRAPARE - Transportation     Lack of Transportation (Medical): No     Lack of  "Transportation (Non-Medical): No   Housing Stability: Low Risk  (7/12/2022)    Housing Stability Vital Sign     Unable to Pay for Housing in the Last Year: No     Number of Places Lived in the Last Year: 1     Unstable Housing in the Last Year: No       Review of patient's allergies indicates:  No Known Allergies    Medications:   Prescriptions Prior to Admission[1]      Physical Exam:    Vital Signs:   Vitals:    02/17/25 0953   BP: 125/72   Pulse: (!) 58   Resp: 18   Temp: 97.7 °F (36.5 °C)     /72 (BP Location: Left arm, Patient Position: Lying)   Pulse (!) 58   Temp 97.7 °F (36.5 °C) (Oral)   Resp 18   Ht 5' 2" (1.575 m)   Wt 45.3 kg (99 lb 12.8 oz)   SpO2 100%   Breastfeeding No   BMI 18.25 kg/m²     General:          Well appearing in no acute distress  Lungs: Clear to auscultation bilaterally, respirations unlabored  Heart: Regular rate and rhythm, S1 and S2 normal, no obvious murmurs  Abdomen:         Soft, non-tender, bowel sounds normal, no masses, no organomegaly        Labs:  Lab Results   Component Value Date    WBC 7.83 01/19/2024    HGB 13.4 01/19/2024    HCT 40.8 01/19/2024    MCV 87.7 01/19/2024     01/19/2024     No results found for: "INR", "PT", "APTT"  Lab Results   Component Value Date     01/19/2024    K 3.5 01/19/2024    CO2 25 01/19/2024    BUN 12.0 01/19/2024    CREATININE 0.76 01/19/2024    LABPROT 7.4 01/19/2024    ALBUMIN 4.3 01/19/2024    BILITOT 0.7 01/19/2024    ALKPHOS 65 01/19/2024    ALT 14 01/19/2024    AST 19 01/19/2024         Assessment and Plan:    History reviewed, vital signs satisfactory, cardiopulmonary status satisfactory.  I have explained the sedation options, risks, benefits, and alternatives of this endoscopic procedure to the patient including but not limited to bleeding, inflammation, infection, perforation, and death.  All questions were answered and the patient consented to proceed with procedure as planned.   The patient is deemed an " appropriate candidate for the sedation as planned.      Kristen Monroy MD, MPH   of Clinical Medicine  Gastroenterology and Hepatology  [facility]    2/17/2025  10:49 AM          [1]   Medications Prior to Admission   Medication Sig Dispense Refill Last Dose/Taking    alendronate (FOSAMAX) 70 MG tablet Take 1 tablet (70 mg total) by mouth every 7 days. 4 tablet 11 Past Week    calcium-vitamin D 250 mg-2.5 mcg (100 unit) per tablet Take 1 tablet by mouth Daily.   Past Week    omega-3 fatty acids/fish oil (FISH OIL-OMEGA-3 FATTY ACIDS) 300-1,000 mg capsule Take by mouth once daily.   Past Week    vitamin D (VITAMIN D3) 1000 units Tab Take 1,000 Units by mouth once daily.   Past Week    blood sugar diagnostic Strp To check BG one times daily, to use with insurance preferred meter 100 each 0     blood-glucose meter kit To check BG one times daily, to use with insurance preferred meter 1 each 0     lancets Misc To check BG one times daily, to use with insurance preferred meter 100 each 0     polyethylene glycol (MOVIPREP) 100-7.5-2.691 gram solution Take 2,000 mLs by mouth As instructed (take as directed per instructions provided by GI clinic). 1 kit 0     TRUE METRIX GLUCOSE METER Misc USE TO TEST EVERY DAY

## 2025-02-17 NOTE — PLAN OF CARE
Dr. Monroy in room giving results to significant other that speaks english. Patient resting. Will monitor.

## 2025-02-17 NOTE — PLAN OF CARE
Patient resting peacefully. Easily awakened. Significant other at bedside. Juice within reach. Call bell in reach.

## 2025-02-17 NOTE — TRANSFER OF CARE
"Anesthesia Transfer of Care Note    Patient: Laura Alexander    Procedure(s) Performed: Procedure(s) (LRB):  COLONOSCOPY (N/A)    Patient location: GI    Anesthesia Type: general    Post pain: adequate analgesia    Post assessment: no apparent anesthetic complications and tolerated procedure well    Post vital signs: stable    Level of consciousness: awake    Nausea/Vomiting: no nausea/vomiting    Complications: none    Transfer of care protocol was followed      Last vitals: Visit Vitals  BP (!) 75/52   Pulse 77   Temp 36.5 °C (97.7 °F) (Oral)   Resp 17   Ht 5' 2" (1.575 m)   Wt 45.3 kg (99 lb 12.8 oz)   SpO2 99%   Breastfeeding No   BMI 18.25 kg/m²     85/56 BP  "

## 2025-02-17 NOTE — PROVATION PATIENT INSTRUCTIONS
Discharge Summary/Instructions after an Endoscopic Procedure  Patient Name: Laura Alexander  Patient MRN: [id number]  Patient YOB: 1965 Monday, February 17, 2025  Kristen Monroy MD  Dear patient,  As a result of recent federal legislation (The Federal Cures Act), you may   receive lab or pathology results from your procedure in your [facility]   account before your physician is able to contact you. Your physician or   their representative will relay the results to you with their   recommendations at their soonest availability.  Thank you,  RESTRICTIONS:  During your procedure today, you received medications for sedation.  These   medications may affect your judgment, balance and coordination.  Therefore,   for 24 hours, you have the following restrictions:   - DO NOT drive a car, operate machinery, make legal/financial decisions,   sign important papers or drink alcohol.    ACTIVITY:  Today: no heavy lifting, straining or running due to procedural   sedation/anesthesia.  The following day: return to full activity including work.  DIET:  Eat and drink normally unless instructed otherwise.     TREATMENT FOR COMMON SIDE EFFECTS:  - Mild abdominal pain, nausea, belching, bloating or excessive gas:  rest,   eat lightly and use a heating pad.  - Sore Throat: treat with throat lozenges and/or gargle with warm salt   water.  - Because air was used during the procedure, expelling large amounts of air   from your rectum or belching is normal.  - If a bowel prep was taken, you may not have a bowel movement for 1-3 days.    This is normal.  SYMPTOMS TO WATCH FOR AND REPORT TO YOUR PHYSICIAN:  1. Abdominal pain or bloating, other than gas cramps.  2. Chest pain.  3. Back pain.  4. Signs of infection such as: chills or fever occurring within 24 hours   after the procedure.  5. Rectal bleeding, which would show as bright red, maroon, or black stools.   (A tablespoon of blood from the rectum is not serious, especially  if   hemorrhoids are present.)  6. Vomiting.  7. Weakness or dizziness.  GO DIRECTLY TO THE NEAREST EMERGENCY ROOM IF YOU HAVE ANY OF THE FOLLOWING:      Difficulty breathing              Chills and/or fever over 101 F   Persistent vomiting and/or vomiting blood   Severe abdominal pain   Severe chest pain   Black, tarry stools   Bleeding- more than one tablespoon   Any other symptom or condition that you feel may need urgent attention  Your doctor recommends these additional instructions:  If any biopsies were taken, your doctors clinic will contact you in 1 to 2   weeks with any results.  Recommendations:  - Patient has a contact number available for emergencies.  The signs and   symptoms of potential delayed complications were discussed with the   patient.  Return to normal activities tomorrow.  Written discharge   instructions were provided to the patient.   - Discharge patient to home.   - Resume previous diet.   - Continue present medications.   - Repeat colonoscopy in 10 years for screening purposes.  Impressions:  - The examined portion of the ileum was normal.   - External and internal hemorrhoids.   - No specimens collected.  For questions, problems or results please call your physician - Kristen Monroy MD at Work:  [phone], Work:  [phone].  [facility] , EMERGENCY ROOM PHONE NUMBER: [phone]  IF A COMPLICATION OR EMERGENCY SITUATION ARISES AND YOU ARE UNABLE TO REACH   YOUR PHYSICIAN - GO DIRECTLY TO THE EMERGENCY ROOM.  Kristen Monroy MD  2/17/2025 11:51:28 AM  This report has been verified and signed electronically.  Dear patient,  As a result of recent federal legislation (The Federal Cures Act), you may   receive lab or pathology results from your procedure in your [facility]   account before your physician is able to contact you. Your physician or   their representative will relay the results to you with their   recommendations at their soonest  availability.  Thank you,  PROVATION

## 2025-02-17 NOTE — ANESTHESIA POSTPROCEDURE EVALUATION
Anesthesia Post Evaluation    Patient: Laura Alexander    Procedure(s) Performed: Procedure(s) (LRB):  COLONOSCOPY (N/A)    Final Anesthesia Type: general      Patient location during evaluation: GI PACU  Patient participation: Yes- Able to Participate  Level of consciousness: awake and alert  Post-procedure vital signs: reviewed and stable  Pain management: adequate  Airway patency: patent    PONV status at discharge: No PONV  Anesthetic complications: no      Cardiovascular status: hemodynamically stable  Respiratory status: unassisted and room air  Follow-up not needed.              Vitals Value Taken Time   BP 75/52 02/17/25 11:40   Temp 36.5 °C (97.7 °F) 02/17/25 09:53   Pulse 77 02/17/25 11:40   Resp 17 02/17/25 11:40   SpO2 99 % 02/17/25 11:40         No case tracking events are documented in the log.      Pain/Abebe Score: Abebe Score: 8 (2/17/2025 11:49 AM)

## 2025-02-18 VITALS
OXYGEN SATURATION: 100 % | RESPIRATION RATE: 18 BRPM | TEMPERATURE: 98 F | HEART RATE: 56 BPM | SYSTOLIC BLOOD PRESSURE: 101 MMHG | HEIGHT: 62 IN | DIASTOLIC BLOOD PRESSURE: 70 MMHG | BODY MASS INDEX: 18.37 KG/M2 | WEIGHT: 99.81 LBS

## 2025-04-14 ENCOUNTER — OFFICE VISIT (OUTPATIENT)
Dept: CARDIOLOGY | Facility: CLINIC | Age: 60
End: 2025-04-14
Payer: MEDICAID

## 2025-04-14 VITALS
BODY MASS INDEX: 18.4 KG/M2 | TEMPERATURE: 98 F | SYSTOLIC BLOOD PRESSURE: 110 MMHG | WEIGHT: 100 LBS | HEIGHT: 62 IN | HEART RATE: 66 BPM | RESPIRATION RATE: 20 BRPM | OXYGEN SATURATION: 100 % | DIASTOLIC BLOOD PRESSURE: 74 MMHG

## 2025-04-14 DIAGNOSIS — R00.1 BRADYCARDIA: ICD-10-CM

## 2025-04-14 DIAGNOSIS — E78.2 MIXED HYPERLIPIDEMIA: ICD-10-CM

## 2025-04-14 DIAGNOSIS — R00.2 PALPITATIONS: Primary | ICD-10-CM

## 2025-04-14 LAB
OHS QRS DURATION: 76 MS
OHS QTC CALCULATION: 408 MS

## 2025-04-14 PROCEDURE — 93005 ELECTROCARDIOGRAM TRACING: CPT

## 2025-04-14 PROCEDURE — 99214 OFFICE O/P EST MOD 30 MIN: CPT | Mod: PBBFAC,25 | Performed by: INTERNAL MEDICINE

## 2025-04-14 NOTE — PATIENT INSTRUCTIONS
- Increase water intake. About 2L per day if working indoors/4L per day if working outdoors.  - You should take vitamin B12 since you are not taking in much meat. Talk to your PCP about checking B12 levels.   - You can follow up as needed.

## 2025-04-14 NOTE — PROGRESS NOTES
CHIEF COMPLAINT:   Chief Complaint   Patient presents with    4 mt w 30 day                                      HPI:  Laura Alexander 59 y.o. female with pmhx osteoporosis and no significant cardiac history returns for follow up visit of palpitations.     Initial visit:  Records show she has been experiencing dizziness in the morning that is relieved when she gets up and eats breakfast. She also experiences dizziness when she bends over and upright quickly and shakes her head from side to side quickly.  Patient reports that she has been experiencing brief runs of tachycardia that last for several seconds and can happen a few times a week while some weeks she is asymptomatic.  She denied chest pains or SOB. Echo showed EF of 60% and was reviewed by Dr. Posey with results as described below. Holter monitor was mostly sinus rhythm and was negative for any concerning findings. Patient also reports palpitations that she associates with these episodes of vertigo. 2017 carotid ultrasound was negative.  Patient reports that she only drinks one 16 oz bottle of water per day and no other fluids.  Patient does report some bouts of diarrhea she can have diarrhea up to 2 to 3 times a day and then go several weeks without having any symptoms.                                         This visit, 04/14/2025                                                                                                                                                Patient presents with her . They both decline  services. In regards to her palpitations, 30-d monitor done which showed one run of NSVT, 8 beats. She triggered device which correlated with sinus rhythm. Predominant rhythm was sinus with HR . She states her symptoms of palpitations and dizziness have improved since she has started to drink a bit more water. Though, she is still only drinking about 1 bottle of water a day with some juices when she does not have time  to eat due to work. It appears she have a very low appetite. She denies early satiety, pain with eating, or other aversion. She just states that she just doesn't eat much. She does not like to eat meat and is essentially eating a vegetarian diet (does eat eggs). Otherwise, soups some days. Not much seafood.                                                                                                                                                                                                                                                                                                    CARDIAC TESTING:  Results for orders placed during the hospital encounter of 08/20/24    Echo    Interpretation by Dr. Posey   -ejection fraction was preserved at 60%   -atrial size were within normal limits   -trace tricuspid regurg was present.      Interpretation Summary    Left Ventricle: The left ventricle is normal in size. Normal wall thickness. There is normal systolic function. Biplane (2D) method of discs ejection fraction is 60%.    Right Ventricle: Normal right ventricular cavity size. Systolic function is normal.    Left Atrium: Left atrium is mildly dilated.    Right Atrium: Right atrium is dilated.    Aortic Valve: The aortic valve is a trileaflet valve. There is no stenosis. Aortic valve peak velocity is 0.85 m/s. Mean gradient is 2 mmHg.    Mitral Valve: The mitral valve is structurally normal. The mean pressure gradient across the mitral valve is 1 mmHg at a heart rate of 68 bpm. There is trace regurgitation.    Tricuspid Valve: The tricuspid valve is structurally normal. There is mild regurgitation.    Pulmonary Artery: The estimated pulmonary artery systolic pressure is 21 mmHg.    IVC/SVC: Normal venous pressure at 3 mmHg.    30-d outpatient telemetry  PHYSICIAN INTERPRETATION   Underlying rhythm:   Sinus  Pauses:  No significant pause noted  Symptoms:  No symptoms reported.   Events:  During patient  activated events the patient is in sinus rhythm with HR 60 to 103  Arrhythmia:  An episode of NSVT at HR of 183 and lasting for 8 beats        Patient Active Problem List   Diagnosis    Hyperlipidemia    Cervicalgia    Cervical radiculopathy    Seasonal allergies    Palpitations    Dizziness    Hypoglycemia    Other fatigue    Age-related osteoporosis without current pathological fracture    Bradycardia    Atrial enlargement, right    Screen for colon cancer     Past Surgical History:   Procedure Laterality Date    appendectomy      BILATERAL TUBAL LIGATION      COLONOSCOPY N/A 2/17/2025    Procedure: COLONOSCOPY;  Surgeon: Kristen Monroy MD;  Location: [facility] ENDOSCOPY;  Service: Gastroenterology;  Laterality: N/A;    HYSTERECTOMY       Social History     Socioeconomic History    Marital status:     Number of children: 0   Tobacco Use    Smoking status: Never    Smokeless tobacco: Never   Substance and Sexual Activity    Alcohol use: Never    Drug use: Never    Sexual activity: Yes     Social Drivers of Health     Transportation Needs: No Transportation Needs (7/12/2022)    PRAPARE - Transportation     Lack of Transportation (Medical): No     Lack of Transportation (Non-Medical): No   Housing Stability: Low Risk  (7/12/2022)    Housing Stability Vital Sign     Unable to Pay for Housing in the Last Year: No     Number of Places Lived in the Last Year: 1     Unstable Housing in the Last Year: No        Family History   Problem Relation Name Age of Onset    Hypertension Mother      Breast cancer Paternal Aunt       Review of patient's allergies indicates:  No Known Allergies    Current Outpatient Medications:     alendronate (FOSAMAX) 70 MG tablet, Take 1 tablet (70 mg total) by mouth every 7 days., Disp: 4 tablet, Rfl: 11    blood sugar diagnostic Strp, To check BG one times daily, to use with insurance preferred meter, Disp: 100 each, Rfl: 0    blood-glucose meter kit, To check BG one times daily, to  "use with insurance preferred meter, Disp: 1 each, Rfl: 0    calcium-vitamin D 250 mg-2.5 mcg (100 unit) per tablet, Take 1 tablet by mouth Daily., Disp: , Rfl:     lancets Misc, To check BG one times daily, to use with insurance preferred meter, Disp: 100 each, Rfl: 0    omega-3 fatty acids/fish oil (FISH OIL-OMEGA-3 FATTY ACIDS) 300-1,000 mg capsule, Take by mouth once daily., Disp: , Rfl:     TRUE METRIX GLUCOSE METER Misc, USE TO TEST EVERY DAY, Disp: , Rfl:     vitamin D (VITAMIN D3) 1000 units Tab, Take 1,000 Units by mouth once daily., Disp: , Rfl:      ROS:                                                                                                                                                                             As per HPI.     Blood pressure 110/74, pulse 66, temperature 97.7 °F (36.5 °C), temperature source Oral, resp. rate 20, height 5' 2" (1.575 m), weight 45.4 kg (100 lb), SpO2 100%.   PE:  Physical Exam  Vitals reviewed.   Constitutional:       General: She is not in acute distress.     Comments: Thin female   HENT:      Head: Normocephalic and atraumatic.      Mouth/Throat:      Mouth: Mucous membranes are dry.   Eyes:      General: No scleral icterus.     Extraocular Movements: Extraocular movements intact.   Neck:      Vascular: No carotid bruit.   Cardiovascular:      Rate and Rhythm: Normal rate and regular rhythm.      Pulses: Normal pulses.      Heart sounds: No murmur heard.  Pulmonary:      Effort: Pulmonary effort is normal. No respiratory distress.      Breath sounds: Normal breath sounds.   Abdominal:      General: Abdomen is flat. There is no distension.      Palpations: Abdomen is soft.   Musculoskeletal:         General: Normal range of motion.      Cervical back: Normal range of motion.      Right lower leg: No edema.      Left lower leg: No edema.   Skin:     General: Skin is warm and dry.      Comments: Hair thinning   Neurological:      General: No focal deficit present. "      Mental Status: She is alert and oriented to person, place, and time.   Psychiatric:         Mood and Affect: Mood normal.         Behavior: Behavior normal.        The 10-year ASCVD risk score (Lion ENGLAND, et al., 2019) is: 1.9%    Values used to calculate the score:      Age: 59 years      Sex: Female      Is Non- : No      Diabetic: No      Tobacco smoker: No      Systolic Blood Pressure: 110 mmHg      Is BP treated: No      HDL Cholesterol: 74 mg/dL      Total Cholesterol: 215 mg/dL      ASSESSMENT/PLAN:  Laura Alexander 59 y.o. female with pmhx osteoporosis and no significant cardiac history returns for follow up visit of palpitations.     Palpitations - improved  - associated dizziness  - previous echos have been unremarkable.    - 30d telemetry outpatient with one episdoe of NSVT 8 beats and sinus rhythm/tach  - symptoms and story concerning for dehydration (only drinking 1 bottle of water 16 oz per day), poor nutritional intake, osteoporosis, h/o hypoglycemic symptoms, low BMI 18; counseled extensively on well balanced diet; I have recommended B12 supplementation to her since she is on a vegetarian diet until she can see her PCP  - she may benefit from checking vit B12, folate levels due to dizziness, h/o radiculopathy and vertigo; defer to her PCP    Hyperlipidemia   -patient's most recent lipid profile was encouraging.  , HDL 74, total 215.    -patient's ASCVD risk is 1.8% today.        RTC 4 months     Nelia Ballard MD  [facility] Fellow, PGY 4

## 2025-06-09 ENCOUNTER — OFFICE VISIT (OUTPATIENT)
Dept: INTERNAL MEDICINE | Facility: CLINIC | Age: 60
End: 2025-06-09
Payer: MEDICAID

## 2025-06-09 ENCOUNTER — LAB VISIT (OUTPATIENT)
Dept: LAB | Facility: HOSPITAL | Age: 60
End: 2025-06-09
Attending: NURSE PRACTITIONER
Payer: MEDICAID

## 2025-06-09 VITALS
DIASTOLIC BLOOD PRESSURE: 66 MMHG | BODY MASS INDEX: 24.54 KG/M2 | OXYGEN SATURATION: 100 % | HEART RATE: 61 BPM | HEIGHT: 62 IN | SYSTOLIC BLOOD PRESSURE: 107 MMHG | TEMPERATURE: 98 F | RESPIRATION RATE: 18 BRPM | WEIGHT: 133.38 LBS

## 2025-06-09 DIAGNOSIS — E78.2 MIXED HYPERLIPIDEMIA: ICD-10-CM

## 2025-06-09 DIAGNOSIS — R61 NIGHT SWEATS: ICD-10-CM

## 2025-06-09 DIAGNOSIS — M54.12 CERVICAL RADICULOPATHY: ICD-10-CM

## 2025-06-09 DIAGNOSIS — R19.7 DIARRHEA, UNSPECIFIED TYPE: Primary | ICD-10-CM

## 2025-06-09 DIAGNOSIS — M48.02 SPINAL STENOSIS, CERVICAL REGION: ICD-10-CM

## 2025-06-09 DIAGNOSIS — M54.2 CERVICALGIA: ICD-10-CM

## 2025-06-09 DIAGNOSIS — R53.83 OTHER FATIGUE: ICD-10-CM

## 2025-06-09 PROBLEM — Z12.11 SCREEN FOR COLON CANCER: Status: RESOLVED | Noted: 2025-02-17 | Resolved: 2025-06-09

## 2025-06-09 LAB
ALBUMIN SERPL-MCNC: 4.1 G/DL (ref 3.5–5)
ALBUMIN/GLOB SERPL: 1.1 RATIO (ref 1.1–2)
ALP SERPL-CCNC: 43 UNIT/L (ref 40–150)
ALT SERPL-CCNC: 18 UNIT/L (ref 0–55)
ANION GAP SERPL CALC-SCNC: 9 MEQ/L
AST SERPL-CCNC: 23 UNIT/L (ref 11–45)
BASOPHILS # BLD AUTO: 0.03 X10(3)/MCL
BASOPHILS NFR BLD AUTO: 0.5 %
BILIRUB SERPL-MCNC: 0.5 MG/DL
BUN SERPL-MCNC: 12.1 MG/DL (ref 9.8–20.1)
CALCIUM SERPL-MCNC: 8.9 MG/DL (ref 8.4–10.2)
CHLORIDE SERPL-SCNC: 109 MMOL/L (ref 98–107)
CHOLEST SERPL-MCNC: 242 MG/DL
CHOLEST/HDLC SERPL: 3 {RATIO} (ref 0–5)
CO2 SERPL-SCNC: 24 MMOL/L (ref 22–29)
CREAT SERPL-MCNC: 0.66 MG/DL (ref 0.55–1.02)
CREAT/UREA NIT SERPL: 18
EOSINOPHIL # BLD AUTO: 0.3 X10(3)/MCL (ref 0–0.9)
EOSINOPHIL NFR BLD AUTO: 4.9 %
ERYTHROCYTE [DISTWIDTH] IN BLOOD BY AUTOMATED COUNT: 12.5 % (ref 11.5–17)
GFR SERPLBLD CREATININE-BSD FMLA CKD-EPI: >60 ML/MIN/1.73/M2
GLOBULIN SER-MCNC: 3.6 GM/DL (ref 2.4–3.5)
GLUCOSE SERPL-MCNC: 86 MG/DL (ref 74–100)
HCT VFR BLD AUTO: 43.1 % (ref 37–47)
HDLC SERPL-MCNC: 73 MG/DL (ref 35–60)
HGB BLD-MCNC: 13.9 G/DL (ref 12–16)
IMM GRANULOCYTES # BLD AUTO: 0.02 X10(3)/MCL (ref 0–0.04)
IMM GRANULOCYTES NFR BLD AUTO: 0.3 %
LDLC SERPL CALC-MCNC: 137 MG/DL (ref 50–140)
LYMPHOCYTES # BLD AUTO: 2.49 X10(3)/MCL (ref 0.6–4.6)
LYMPHOCYTES NFR BLD AUTO: 40.6 %
MCH RBC QN AUTO: 29.1 PG (ref 27–31)
MCHC RBC AUTO-ENTMCNC: 32.3 G/DL (ref 33–36)
MCV RBC AUTO: 90.2 FL (ref 80–94)
MONOCYTES # BLD AUTO: 0.35 X10(3)/MCL (ref 0.1–1.3)
MONOCYTES NFR BLD AUTO: 5.7 %
NEUTROPHILS # BLD AUTO: 2.94 X10(3)/MCL (ref 2.1–9.2)
NEUTROPHILS NFR BLD AUTO: 48 %
NRBC BLD AUTO-RTO: 0 %
PLATELET # BLD AUTO: 266 X10(3)/MCL (ref 130–400)
PMV BLD AUTO: 8.1 FL (ref 7.4–10.4)
POTASSIUM SERPL-SCNC: 4.1 MMOL/L (ref 3.5–5.1)
PROT SERPL-MCNC: 7.7 GM/DL (ref 6.4–8.3)
RBC # BLD AUTO: 4.78 X10(6)/MCL (ref 4.2–5.4)
SODIUM SERPL-SCNC: 142 MMOL/L (ref 136–145)
TRIGL SERPL-MCNC: 162 MG/DL (ref 37–140)
VLDLC SERPL CALC-MCNC: 32 MG/DL
WBC # BLD AUTO: 6.13 X10(3)/MCL (ref 4.5–11.5)

## 2025-06-09 PROCEDURE — 80053 COMPREHEN METABOLIC PANEL: CPT

## 2025-06-09 PROCEDURE — 3078F DIAST BP <80 MM HG: CPT | Mod: CPTII,,, | Performed by: NURSE PRACTITIONER

## 2025-06-09 PROCEDURE — 3008F BODY MASS INDEX DOCD: CPT | Mod: CPTII,,, | Performed by: NURSE PRACTITIONER

## 2025-06-09 PROCEDURE — 1159F MED LIST DOCD IN RCRD: CPT | Mod: CPTII,,, | Performed by: NURSE PRACTITIONER

## 2025-06-09 PROCEDURE — 85025 COMPLETE CBC W/AUTO DIFF WBC: CPT

## 2025-06-09 PROCEDURE — 1160F RVW MEDS BY RX/DR IN RCRD: CPT | Mod: CPTII,,, | Performed by: NURSE PRACTITIONER

## 2025-06-09 PROCEDURE — 3074F SYST BP LT 130 MM HG: CPT | Mod: CPTII,,, | Performed by: NURSE PRACTITIONER

## 2025-06-09 PROCEDURE — 36415 COLL VENOUS BLD VENIPUNCTURE: CPT

## 2025-06-09 PROCEDURE — 80061 LIPID PANEL: CPT

## 2025-06-09 PROCEDURE — 99214 OFFICE O/P EST MOD 30 MIN: CPT | Mod: S$PBB,,, | Performed by: NURSE PRACTITIONER

## 2025-06-09 PROCEDURE — 99215 OFFICE O/P EST HI 40 MIN: CPT | Mod: PBBFAC | Performed by: NURSE PRACTITIONER

## 2025-06-09 RX ORDER — PRAVASTATIN SODIUM 20 MG/1
20 TABLET ORAL NIGHTLY
Qty: 90 TABLET | Refills: 1 | Status: SHIPPED | OUTPATIENT
Start: 2025-06-09 | End: 2026-06-09

## 2025-06-09 RX ORDER — MULTIVITAMIN
1 TABLET ORAL DAILY
COMMUNITY

## 2025-06-09 NOTE — PROGRESS NOTES
Nancy L Jason, NP   [facility]  [address] [facility] [address]      PATIENT NAME: Laura Alexander  : 1965  DATE: 25  MRN: [id number]        History of Present Illness / Problem Focused Workflow     Laura Alexander presents to the clinic with Follow-up and Labs Only (6 month f/u hypoglycemia , neck f/u , and fasting labs)     59-year-old [place] female accompanied by her  for follow up/ lab review. She states she continues to endorse neck pain despite engaging in PT. Some days better than others. Sometimes gets relief with exercise. When pain severe, takes OTC tylenol or Advil, massage and oils with rest with some relief. She reports episodes of diarrhea, feeling weakness and tired 3-4 x per week ongoing for the last year. She denies traveling out of country in the last 10 years. She denies any fever, chills, LAD. Does feel she gets sweaty upon awakening in the morning. She states she will have bouts of about 10 episodes of diarrhea without abdominal pain, bloody stools, n/v, poor appetite or wt loss. Denies known family history of IBD. She had colonoscopy completed 25 with normal findings except external and internal hemorrhoids.  Recommendation was to repeat colonoscopy in 10 years for screening purposes. Blood sugar has been good with lowest in 70s upon awakening in the morning. No other concerns stated such as CP, SOB.    Other providers   [facility] cardiology   [facility] GI- Colonoscopy    Review of Systems     Review of Systems   Constitutional: Negative.    HENT: Negative.     Eyes: Negative.    Respiratory: Negative.     Cardiovascular: Negative.    Gastrointestinal:  Positive for diarrhea.   Endocrine: Negative.    Genitourinary: Negative.    Musculoskeletal:  Positive for neck pain.   Skin: Negative.    Allergic/Immunologic: Negative.    Neurological: Negative.    Hematological: Negative.    Psychiatric/Behavioral:  "Negative.         Medical / Social / Family History     -------------------------------------    HLD (hyperlipidemia)    Need for vaccination    Screen for colon cancer        Past Surgical History:   Procedure Laterality Date    appendectomy      BILATERAL TUBAL LIGATION      COLONOSCOPY N/A 2/17/2025    Procedure: COLONOSCOPY;  Surgeon: Kristen Monroy MD;  Location: [facility] ENDOSCOPY;  Service: Gastroenterology;  Laterality: N/A;    HYSTERECTOMY         Social History[1]     Family History   Problem Relation Name Age of Onset    Hypertension Mother      Breast cancer Paternal Aunt          Medications and Allergies     Medications  Current Outpatient Medications   Medication Instructions    alendronate (FOSAMAX) 70 mg, Oral, Every 7 days    blood sugar diagnostic Strp To check BG one times daily, to use with insurance preferred meter    blood-glucose meter kit To check BG one times daily, to use with insurance preferred meter    calcium-vitamin D 250 mg-2.5 mcg (100 unit) per tablet 1 tablet, Daily    lancets Misc To check BG one times daily, to use with insurance preferred meter    multivitamin (THERAGRAN) per tablet 1 tablet, Daily    omega-3 fatty acids/fish oil (FISH OIL-OMEGA-3 FATTY ACIDS) 300-1,000 mg capsule Daily    pravastatin (PRAVACHOL) 20 mg, Oral, Nightly, For cholesterol    TRUE METRIX GLUCOSE METER Misc USE TO TEST EVERY DAY    vitamin D (VITAMIN D3) 1,000 Units, Daily       Allergies  Review of patient's allergies indicates:  No Known Allergies    Physical Examination     Visit Vitals  /66   Pulse 61   Temp 97.6 °F (36.4 °C) (Oral)   Resp 18   Ht 5' 2" (1.575 m)   Wt 60.5 kg (133 lb 6.4 oz)   SpO2 100%   BMI 24.40 kg/m²       Physical Exam  Constitutional:       General: She is not in acute distress.     Appearance: Normal appearance. She is not ill-appearing, toxic-appearing or diaphoretic.   HENT:      Head: Normocephalic.   Cardiovascular:      Rate and Rhythm: Normal rate and regular " rhythm.      Heart sounds: No murmur heard.  Pulmonary:      Effort: Pulmonary effort is normal. No respiratory distress.      Breath sounds: Normal breath sounds. No stridor. No wheezing, rhonchi or rales.   Abdominal:      General: Bowel sounds are normal. There is no distension.      Palpations: Abdomen is soft. There is no mass.      Tenderness: There is no abdominal tenderness. There is no guarding.      Hernia: No hernia is present.   Skin:     General: Skin is warm and dry.   Neurological:      Mental Status: She is alert and oriented to person, place, and time. Mental status is at baseline.      Coordination: Coordination normal.      Gait: Gait normal.   Psychiatric:         Mood and Affect: Mood normal.         Behavior: Behavior normal.         Thought Content: Thought content normal.         Judgment: Judgment normal.           Results     Lab Results   Component Value Date    WBC 6.13 06/09/2025    RBC 4.78 06/09/2025    HGB 13.9 06/09/2025    HCT 43.1 06/09/2025    MCV 90.2 06/09/2025    MCH 29.1 06/09/2025    MCHC 32.3 (L) 06/09/2025    RDW 12.5 06/09/2025     06/09/2025    MPV 8.1 06/09/2025     Sodium   Date Value Ref Range Status   06/09/2025 142 136 - 145 mmol/L Final     Potassium   Date Value Ref Range Status   06/09/2025 4.1 3.5 - 5.1 mmol/L Final     Chloride   Date Value Ref Range Status   06/09/2025 109 (H) 98 - 107 mmol/L Final     CO2   Date Value Ref Range Status   06/09/2025 24 22 - 29 mmol/L Final     Glucose   Date Value Ref Range Status   06/09/2025 86 74 - 100 mg/dL Final     Blood Urea Nitrogen   Date Value Ref Range Status   06/09/2025 12.1 9.8 - 20.1 mg/dL Final     Creatinine   Date Value Ref Range Status   06/09/2025 0.66 0.55 - 1.02 mg/dL Final     Calcium   Date Value Ref Range Status   06/09/2025 8.9 8.4 - 10.2 mg/dL Final     Protein Total   Date Value Ref Range Status   06/09/2025 7.7 6.4 - 8.3 gm/dL Final     Albumin   Date Value Ref Range Status   06/09/2025 4.1  3.5 - 5.0 g/dL Final     Bilirubin Total   Date Value Ref Range Status   06/09/2025 0.5 <=1.5 mg/dL Final     ALP   Date Value Ref Range Status   06/09/2025 43 40 - 150 unit/L Final     AST   Date Value Ref Range Status   06/09/2025 23 11 - 45 unit/L Final     ALT   Date Value Ref Range Status   06/09/2025 18 0 - 55 unit/L Final     Estimated GFR-Non    Date Value Ref Range Status   10/13/2021 97 >>=90 mL/min/1.73 m2 Final     Lab Results   Component Value Date    CHOL 242 (H) 06/09/2025     Lab Results   Component Value Date    HDL 73 (H) 06/09/2025     Lab Results   Component Value Date    TRIG 162 (H) 06/09/2025     Lab Results   Component Value Date    .00 06/09/2025     Lab Results   Component Value Date    TSH 1.336 09/03/2024     Lab Results   Component Value Date    PHUR 6.0 09/03/2024    PROTEINUA Negative 09/03/2024    GLUCUA Normal 09/03/2024    KETONESU Negative 10/13/2021    OCCULTUA Negative 09/03/2024    NITRITE Negative 09/03/2024    LEUKOCYTESUR Negative 09/03/2024     Lab Results   Component Value Date    HGBA1C 5.5 09/03/2024    HGBA1C 5.5 10/13/2021    HGBA1C 5.7 01/08/2020     Lab Results   Component Value Date    MICALBCREAT  09/03/2024      Comment:      UNABLE TO CALC        Assessment       ICD-10-CM ICD-9-CM   1. Diarrhea, unspecified type  R19.7 787.91   2. Cervicalgia  M54.2 723.1   3. Cervical radiculopathy  M54.12 723.4   4. Other fatigue  R53.83 780.79   5. Night sweats  R61 780.8   6. Mixed hyperlipidemia  E78.2 272.2   7. Spinal stenosis, cervical region  M48.02 723.0       Plan       Problem List Items Addressed This Visit          Neuro    Cervical radiculopathy    Current Assessment & Plan   Pt reports completed PT and engages in HEP with stable ongoing symptoms. She has b/l arm pain and numbness intermittently.   When pain severe takes otc pain relievers with some relief    Prior XR cervical spine 9/9/24 FINDINGS:  There degenerative changes at C4-5 C5-6  there is loss of the normal lordotic curvature.  There is a mild retrolisthesis of C4 on C5 and C5 on C6.  The odontoid is intact.     Impression:     Degenerative changes, no acute fractures are seen    Pt wants to proceed with MRI for further work up          Relevant Orders    MRI Cervical Spine Without Contrast       Cardiac/Vascular    Hyperlipidemia    Current Assessment & Plan   Lab Results   Component Value Date    CHOL 242 (H) 06/09/2025     Lab Results   Component Value Date    HDL 73 (H) 06/09/2025     Lab Results   Component Value Date    TRIG 162 (H) 06/09/2025     Lab Results   Component Value Date    .00 06/09/2025     Avoid tobacco/ alcohol  Follow low fat/low cholesterol diet such as avoid/ decrease zuniga, sausage, fried foods, cookies, cakes, chips, cheese, whole milk, butter, mayonnaise. Add olive oil, avocados, lean meats, fresh fruits/ vegetables, heart healthy nuts to diet.  Educated on health benefits of exercise 5 days/ week of at least 30 minutes moderate intensity exercise (brisk walking) and 2 days/ week of muscle strength activities  Begin pravastatin 20 mg once daily            Relevant Medications    pravastatin (PRAVACHOL) 20 MG tablet    Other Relevant Orders    Lipid Panel       GI    Diarrhea - Primary    Current Assessment & Plan   She reports for the last year having 3-4 days/wk with approx 10 episodes of diarrhea and feeling weak, dizzy and fatigue   Had colonoscopy 2/2025 - normal except ext/internal hemorrhoids, repeat 10 yr   recommended  No weight loss   Stool studies ordered and referral to GI           Relevant Orders    Helicobacter Pylori Antigen Fecal EIA    Calprotectin, Stool    GIARDIA/CRYPTOSPORIDIUM ANTIGEN    Stool Exam-Ova,Cysts,Parasites    Clostridium Diff Toxin, A & B, EIA    Stool Culture    Ambulatory referral/consult to Gastroenterology       Orthopedic    Cervicalgia    Current Assessment & Plan   See cervical radiculopathy         Relevant Orders     MRI Cervical Spine Without Contrast       Other    Night sweats    Other fatigue     Other Visit Diagnoses         Spinal stenosis, cervical region        Relevant Orders    MRI Cervical Spine Without Contrast            Future Appointments   Date Time Provider Department Center   9/22/2025  9:20 AM Nancy Gomez NP [facility]        Follow up in about 2 months (around 8/9/2025) for HLD with fasting labs and f/u diarrhea, night sweats.    Signature:  Nancy Gomez NP  [facility]  [address] [facility] [address]    Date of encounter: 6/9/25         [1]   Social History  Socioeconomic History    Marital status:     Number of children: 0   Tobacco Use    Smoking status: Never    Smokeless tobacco: Never   Substance and Sexual Activity    Alcohol use: Never    Drug use: Never    Sexual activity: Yes     Social Drivers of Health     Transportation Needs: No Transportation Needs (7/12/2022)    PRAPARE - Transportation     Lack of Transportation (Medical): No     Lack of Transportation (Non-Medical): No   Housing Stability: Low Risk  (7/12/2022)    Housing Stability Vital Sign     Unable to Pay for Housing in the Last Year: No     Number of Places Lived in the Last Year: 1     Unstable Housing in the Last Year: No

## 2025-06-09 NOTE — ASSESSMENT & PLAN NOTE
Lab Results   Component Value Date    CHOL 242 (H) 06/09/2025     Lab Results   Component Value Date    HDL 73 (H) 06/09/2025     Lab Results   Component Value Date    TRIG 162 (H) 06/09/2025     Lab Results   Component Value Date    .00 06/09/2025     Avoid tobacco/ alcohol  Follow low fat/low cholesterol diet such as avoid/ decrease zuniga, sausage, fried foods, cookies, cakes, chips, cheese, whole milk, butter, mayonnaise. Add olive oil, avocados, lean meats, fresh fruits/ vegetables, heart healthy nuts to diet.  Educated on health benefits of exercise 5 days/ week of at least 30 minutes moderate intensity exercise (brisk walking) and 2 days/ week of muscle strength activities  Begin pravastatin 20 mg once daily

## 2025-06-09 NOTE — ASSESSMENT & PLAN NOTE
She reports for the last year having 3-4 days/wk with approx 10 episodes of diarrhea and feeling weak, dizzy and fatigue   Had colonoscopy 2/2025 - normal except ext/internal hemorrhoids, repeat 10 yr   recommended  No weight loss   Stool studies ordered and referral to GI

## 2025-06-09 NOTE — ASSESSMENT & PLAN NOTE
Pt reports completed PT and engages in HEP with stable ongoing symptoms. She has b/l arm pain and numbness intermittently.   When pain severe takes otc pain relievers with some relief    Prior XR cervical spine 9/9/24 FINDINGS:  There degenerative changes at C4-5 C5-6 there is loss of the normal lordotic curvature.  There is a mild retrolisthesis of C4 on C5 and C5 on C6.  The odontoid is intact.     Impression:     Degenerative changes, no acute fractures are seen    Pt wants to proceed with MRI for further work up

## 2025-06-23 ENCOUNTER — APPOINTMENT (OUTPATIENT)
Dept: LAB | Facility: HOSPITAL | Age: 60
End: 2025-06-23
Attending: NURSE PRACTITIONER
Payer: MEDICAID

## 2025-06-25 ENCOUNTER — RESULTS FOLLOW-UP (OUTPATIENT)
Dept: INTERNAL MEDICINE | Facility: CLINIC | Age: 60
End: 2025-06-25
Payer: MEDICAID

## 2025-06-25 NOTE — PROGRESS NOTES
Please inform patient stool test for giardia/ cryptosporidium and H pylori are negative. Awaiting other test results completion and will inform once receive.

## 2025-06-30 ENCOUNTER — HOSPITAL ENCOUNTER (OUTPATIENT)
Dept: RADIOLOGY | Facility: HOSPITAL | Age: 60
Discharge: HOME | End: 2025-06-30
Attending: NURSE PRACTITIONER
Payer: MEDICAID

## 2025-06-30 ENCOUNTER — TELEPHONE (OUTPATIENT)
Dept: INTERNAL MEDICINE | Facility: CLINIC | Age: 60
End: 2025-06-30
Payer: MEDICAID

## 2025-06-30 DIAGNOSIS — M54.2 CERVICALGIA: ICD-10-CM

## 2025-06-30 DIAGNOSIS — M54.12 CERVICAL RADICULOPATHY: ICD-10-CM

## 2025-06-30 DIAGNOSIS — M48.02 SPINAL STENOSIS, CERVICAL REGION: ICD-10-CM

## 2025-06-30 PROBLEM — E04.1 NODULE OF RIGHT LOBE OF THYROID GLAND: Status: ACTIVE | Noted: 2025-06-30

## 2025-06-30 PROBLEM — M50.30 DDD (DEGENERATIVE DISC DISEASE), CERVICAL: Status: ACTIVE | Noted: 2025-06-30

## 2025-06-30 PROCEDURE — 72141 MRI NECK SPINE W/O DYE: CPT | Mod: TC

## 2025-06-30 NOTE — TELEPHONE ENCOUNTER
Called pt via  Ho ID # [id number] for stool and MRI results from PCP below. LVM to return call to [facility].    Please inform patient stool test for giardia/ cryptosporidium and H pylori are negative. Awaiting other test results completion and will inform once receive.

## 2025-06-30 NOTE — TELEPHONE ENCOUNTER
----- Message from Nancy Gomez NP sent at 6/30/2025  2:51 PM CDT -----  Please inform patient MRI cervical spine results reviewed. MRI shows canal stenosis and neural foraminal narrowing. This means there is narrowing of the spinal canal and of the openings through which   the spinal nerves exit, potentially compressing the nerves and spinal cord. Recommendation would be referral to neurosurgeon for further treatment options. We can send referral to neurosurgeon to [place] or [place] area [facility] facility; please ask if she has preference of location for referral.     Also noted on MRI results incidental finding of thyroid nodule measuring 2.3 cm. Recommendation is to further evaluation with thyroid US and order has been placed.    ----- Message -----  From: Interface, Rad Results In  Sent: 6/30/2025   9:11 AM CDT  To: Nancy Gomez NP

## 2025-07-03 NOTE — TELEPHONE ENCOUNTER
Called pt via  Tan ID # [id number] for stool and MRI results from PCP below. Spoke with Rolo, pt's boyfriend and relayed message below. Rolo verbalized understanding and stated he will inform pt.     Please inform patient stool test for giardia/ cryptosporidium and H pylori are negative. Awaiting other test results completion and will inform once receive.

## 2025-07-08 ENCOUNTER — RESULTS FOLLOW-UP (OUTPATIENT)
Dept: INTERNAL MEDICINE | Facility: CLINIC | Age: 60
End: 2025-07-08
Payer: MEDICAID

## 2025-07-08 DIAGNOSIS — E04.1 THYROID CYST: Primary | ICD-10-CM

## 2025-07-08 NOTE — PROGRESS NOTES
Please let pt know Thyroid US results shows cyst measuring 2.4 cm to right thyroid lobe. Referral to ENT ordered for further eval/ treatment.

## 2025-07-10 ENCOUNTER — TELEPHONE (OUTPATIENT)
Dept: INTERNAL MEDICINE | Facility: CLINIC | Age: 60
End: 2025-07-10
Payer: MEDICAID

## 2025-07-10 NOTE — TELEPHONE ENCOUNTER
----- Message from Nancy Gomez NP sent at 7/8/2025  2:31 PM CDT -----  Please let pt know Thyroid US results shows cyst measuring 2.4 cm to right thyroid lobe. Referral to ENT ordered for further eval/ treatment.   ----- Message -----  From: Interface, Rad Results In  Sent: 7/7/2025   9:26 AM CDT  To: Nancy Gomez NP

## 2025-07-11 ENCOUNTER — TELEPHONE (OUTPATIENT)
Dept: INTERNAL MEDICINE | Facility: CLINIC | Age: 60
End: 2025-07-11
Payer: MEDICAID

## 2025-07-11 DIAGNOSIS — E04.1 THYROID CYST: Primary | ICD-10-CM

## 2025-07-11 NOTE — TELEPHONE ENCOUNTER
Please inform patient/ patient daughter referral to ENT denied.   Referral to IR ordered for patient to have thyroid cyst drained. She will be contacted by IR department to get scheduled.     Thank you

## 2025-07-30 ENCOUNTER — TELEPHONE (OUTPATIENT)
Dept: INTERVENTIONAL RADIOLOGY/VASCULAR | Facility: HOSPITAL | Age: 60
End: 2025-07-30
Payer: MEDICAID

## 2025-07-31 ENCOUNTER — HOSPITAL ENCOUNTER (OUTPATIENT)
Dept: INTERVENTIONAL RADIOLOGY/VASCULAR | Facility: HOSPITAL | Age: 60
Discharge: HOME | End: 2025-07-31
Attending: NURSE PRACTITIONER
Payer: MEDICAID

## 2025-07-31 DIAGNOSIS — E04.1 THYROID CYST: ICD-10-CM

## 2025-07-31 NOTE — INTERVAL H&P NOTE
The patient has been examined and the H&P has been reviewed:    I concur with the findings and no changes have occurred since H&P was written. Laura Alexander is a 59 y.o. female with Right Thyroid Cyst & Nodule who presents for Right Thyroid Cyst aspiration & Nodule Biopsy.           There are no hospital problems to display for this patient.

## 2025-08-05 DIAGNOSIS — M81.0 AGE-RELATED OSTEOPOROSIS WITHOUT CURRENT PATHOLOGICAL FRACTURE: ICD-10-CM

## 2025-08-06 ENCOUNTER — TELEPHONE (OUTPATIENT)
Dept: INTERVENTIONAL RADIOLOGY/VASCULAR | Facility: HOSPITAL | Age: 60
End: 2025-08-06
Payer: MEDICAID

## 2025-08-06 RX ORDER — ALENDRONATE SODIUM 70 MG/1
70 TABLET ORAL
Qty: 12 TABLET | Refills: 4 | Status: SHIPPED | OUTPATIENT
Start: 2025-08-06

## 2025-08-06 NOTE — TELEPHONE ENCOUNTER
Pt's pharmacy requesting refill for pt's  alendronate (FOSAMAX) 70 MG tablet           LOV:6/9/25    NOV:  9/22/25

## 2025-08-06 NOTE — TELEPHONE ENCOUNTER
Patient called and reminded her about IR fna but the patient didn't answer. I left a voice message.

## 2025-08-07 ENCOUNTER — HOSPITAL ENCOUNTER (OUTPATIENT)
Dept: INTERVENTIONAL RADIOLOGY/VASCULAR | Facility: HOSPITAL | Age: 60
Discharge: HOME | End: 2025-08-07
Attending: NURSE PRACTITIONER
Payer: MEDICAID

## 2025-08-07 PROBLEM — E04.1 THYROID CYST: Chronic | Status: ACTIVE | Noted: 2025-06-30

## 2025-08-07 NOTE — DISCHARGE SUMMARY
Radiology Post-Procedure Note    Pre Op Diagnosis: Thyroid cyst, Right    Post Op Diagnosis: Same    Secondary Diagnoses:   Problem List Items Addressed This Visit    None       Procedure: US Guided Right Thyroid Cyst Aspiration & Biopsy    Procedure performed by: Rasheed Campos MD    Assistant: None    Written Informed Consent Obtained: Yes    Specimen Removed: 3 cc colloid & FNA x 3    Estimated Blood Loss: <10 cc    Condition: Stable    Outcome: The patient tolerated the procedure well and was without complications.    For further details please see the imaging report associated.    Disposition: Home or Self Care    Follow Up: With primary care provider    Discharge Instructions:  No discharge procedures on file.       Time Spent On Discharge: 2 minutes

## 2025-08-08 ENCOUNTER — TELEPHONE (OUTPATIENT)
Dept: INTERVENTIONAL RADIOLOGY/VASCULAR | Facility: HOSPITAL | Age: 60
End: 2025-08-08
Payer: MEDICAID

## 2025-08-08 NOTE — TELEPHONE ENCOUNTER
Called and spoke with a man who answered the phone. He stated the patient is doing well. She had some discomfort yesterday afterwards but she is doing ok this morning.

## 2025-08-18 ENCOUNTER — RESULTS FOLLOW-UP (OUTPATIENT)
Dept: INTERNAL MEDICINE | Facility: CLINIC | Age: 60
End: 2025-08-18
Payer: MEDICAID

## 2025-08-22 ENCOUNTER — TELEPHONE (OUTPATIENT)
Dept: INTERNAL MEDICINE | Facility: CLINIC | Age: 60
End: 2025-08-22
Payer: MEDICAID

## (undated) DEVICE — MANIFOLD 4 PORT

## (undated) DEVICE — KIT SURGICAL COLON .25 1.1OZ